# Patient Record
Sex: MALE | Race: BLACK OR AFRICAN AMERICAN | HISPANIC OR LATINO | Employment: FULL TIME | URBAN - METROPOLITAN AREA
[De-identification: names, ages, dates, MRNs, and addresses within clinical notes are randomized per-mention and may not be internally consistent; named-entity substitution may affect disease eponyms.]

---

## 2020-03-13 ENCOUNTER — HOSPITAL ENCOUNTER (EMERGENCY)
Facility: HOSPITAL | Age: 20
Discharge: HOME | End: 2020-03-13
Attending: EMERGENCY MEDICINE
Payer: COMMERCIAL

## 2020-03-13 VITALS
HEART RATE: 69 BPM | OXYGEN SATURATION: 97 % | TEMPERATURE: 98 F | SYSTOLIC BLOOD PRESSURE: 181 MMHG | RESPIRATION RATE: 18 BRPM | WEIGHT: 250 LBS | DIASTOLIC BLOOD PRESSURE: 98 MMHG

## 2020-03-13 DIAGNOSIS — R82.81 PYURIA: ICD-10-CM

## 2020-03-13 DIAGNOSIS — M54.50 LOW BACK PAIN: Primary | ICD-10-CM

## 2020-03-13 LAB
BACTERIA UR QL AUTO: ABNORMAL /HPF
BILIRUB UR QL STRIP: NEGATIVE
CLARITY UR: CLEAR
COLOR UR: YELLOW
GLUCOSE UR STRIP-MCNC: NEGATIVE MG/DL
HGB UR QL STRIP.AUTO: NEGATIVE
KETONES UR STRIP-MCNC: NEGATIVE MG/DL
LEUKOCYTE ESTERASE UR QL STRIP: ABNORMAL
NITRITE UR QL STRIP: NEGATIVE
NON-SQ EPI CELLS URNS QL MICRO: ABNORMAL /HPF
PH UR STRIP.AUTO: 6.5 [PH] (ref 4.5–8)
PROT UR STRIP-MCNC: ABNORMAL MG/DL
RBC #/AREA URNS AUTO: ABNORMAL /HPF
SP GR UR STRIP.AUTO: 1.02 (ref 1–1.03)
UROBILINOGEN UR QL STRIP.AUTO: 0.2 E.U./DL
WBC #/AREA URNS AUTO: ABNORMAL /HPF

## 2020-03-13 PROCEDURE — 99284 EMERGENCY DEPT VISIT MOD MDM: CPT | Performed by: PHYSICIAN ASSISTANT

## 2020-03-13 PROCEDURE — 99283 EMERGENCY DEPT VISIT LOW MDM: CPT

## 2020-03-13 PROCEDURE — 81001 URINALYSIS AUTO W/SCOPE: CPT

## 2020-03-13 PROCEDURE — 87086 URINE CULTURE/COLONY COUNT: CPT

## 2020-03-13 PROCEDURE — 96372 THER/PROPH/DIAG INJ SC/IM: CPT

## 2020-03-13 RX ORDER — CEPHALEXIN 500 MG/1
500 CAPSULE ORAL EVERY 12 HOURS SCHEDULED
Qty: 14 CAPSULE | Refills: 0 | Status: SHIPPED | OUTPATIENT
Start: 2020-03-13 | End: 2020-03-20

## 2020-03-13 RX ORDER — AMLODIPINE BESYLATE 5 MG/1
5 TABLET ORAL DAILY
COMMUNITY
Start: 2020-01-03

## 2020-03-13 RX ORDER — NAPROXEN 500 MG/1
500 TABLET ORAL 2 TIMES DAILY PRN
Qty: 30 TABLET | Refills: 0 | Status: SHIPPED | OUTPATIENT
Start: 2020-03-13

## 2020-03-13 RX ORDER — KETOROLAC TROMETHAMINE 30 MG/ML
15 INJECTION, SOLUTION INTRAMUSCULAR; INTRAVENOUS ONCE
Status: COMPLETED | OUTPATIENT
Start: 2020-03-13 | End: 2020-03-13

## 2020-03-13 RX ADMIN — KETOROLAC TROMETHAMINE 15 MG: 30 INJECTION, SOLUTION INTRAMUSCULAR at 14:09

## 2020-03-13 NOTE — ED PROVIDER NOTES
History  Chief Complaint   Patient presents with    Back Pain     Right sided back pain x 2 days, worse with movement denies urinary sx  Hubert Land is a 24 yo male presenting with 2 days of right low back pain which she states began gradually after work  Patient states he frequently bends over lift heavy boxes while at work  Denies specific injury, trauma, or fall  No rashes or lesions  Denies abdominal pain, nausea, vomiting and diarrhea, fevers, or chills  Denies dysuria, urinary urgency, urinary frequency, or gross hematuria  No prior history of similar  History provided by:  Patient   used: No    Back Pain   Location:  Lumbar spine  Onset quality:  Gradual  Duration:  2 days  Timing:  Constant  Progression:  Waxing and waning  Chronicity:  New  Context: lifting heavy objects    Context: not falling and not recent injury    Relieved by:  Being still  Worsened by:  Touching, twisting and bending  Ineffective treatments:  None tried  Associated symptoms: no abdominal pain, no bladder incontinence, no bowel incontinence, no chest pain, no dysuria, no fever, no headaches, no leg pain, no numbness, no paresthesias, no pelvic pain, no perianal numbness and no weakness    Risk factors: no recent surgery and no steroid use        Prior to Admission Medications   Prescriptions Last Dose Informant Patient Reported? Taking? amLODIPine (NORVASC) 5 mg tablet   Yes Yes   Sig: Take 5 mg by mouth daily      Facility-Administered Medications: None       Past Medical History:   Diagnosis Date    Asthma     Hypertension        History reviewed  No pertinent surgical history  History reviewed  No pertinent family history  I have reviewed and agree with the history as documented      E-Cigarette/Vaping    E-Cigarette Use Never User      E-Cigarette/Vaping Substances     Social History     Tobacco Use    Smoking status: Never Smoker    Smokeless tobacco: Never Used   Substance Use Topics    Alcohol use: Never     Frequency: Never    Drug use: Yes     Types: Marijuana       Review of Systems   Constitutional: Negative for activity change, chills and fever  HENT: Negative for congestion, ear discharge, ear pain, mouth sores, rhinorrhea, sinus pressure, sinus pain, sore throat and voice change  Eyes: Negative for photophobia, pain, redness and visual disturbance  Respiratory: Negative for cough, chest tightness, shortness of breath and wheezing  Cardiovascular: Negative for chest pain and palpitations  Gastrointestinal: Negative for abdominal pain, bowel incontinence, constipation, diarrhea, nausea and vomiting  Genitourinary: Negative for bladder incontinence, difficulty urinating, dysuria, enuresis, flank pain, frequency, hematuria, pelvic pain, scrotal swelling, testicular pain and urgency  Musculoskeletal: Positive for back pain  Negative for gait problem, myalgias, neck pain and neck stiffness  Skin: Negative for pallor, rash and wound  Neurological: Negative for dizziness, tremors, speech difficulty, weakness, light-headedness, numbness, headaches and paresthesias  No loss of bowel/bladder control       Physical Exam  Physical Exam   Constitutional: He is oriented to person, place, and time  He appears well-developed and well-nourished  No distress  HENT:   Head: Normocephalic and atraumatic  Right Ear: External ear normal    Left Ear: External ear normal    Nose: Nose normal    Mouth/Throat: Oropharynx is clear and moist  No oropharyngeal exudate  Eyes: Pupils are equal, round, and reactive to light  Conjunctivae and EOM are normal    Neck: Normal range of motion  Neck supple  Cardiovascular: Normal rate, regular rhythm, normal heart sounds and intact distal pulses  Exam reveals no gallop and no friction rub  No murmur heard  Pulmonary/Chest: Effort normal and breath sounds normal  No respiratory distress  He has no wheezes  He has no rales  Abdominal: Soft  He exhibits no distension and no mass  There is no tenderness  There is no rigidity, no rebound, no guarding, no CVA tenderness, no tenderness at McBurney's point and negative Spence's sign  Musculoskeletal: Normal range of motion  He exhibits no edema, tenderness or deformity  Arms:  Lymphadenopathy:     He has no cervical adenopathy  Neurological: He is alert and oriented to person, place, and time  He has normal strength  He displays normal reflexes  No cranial nerve deficit or sensory deficit  He exhibits normal muscle tone  Coordination and gait normal    Skin: Skin is warm and dry  Capillary refill takes less than 2 seconds  No rash noted  He is not diaphoretic  No erythema  No pallor  Psychiatric: He has a normal mood and affect  His behavior is normal  Judgment and thought content normal    Nursing note and vitals reviewed        Vital Signs  ED Triage Vitals   Temperature Pulse Respirations Blood Pressure SpO2   03/13/20 1229 03/13/20 1229 03/13/20 1229 03/13/20 1229 03/13/20 1229   98 °F (36 7 °C) 69 18 (!) 186/98 97 %      Temp Source Heart Rate Source Patient Position - Orthostatic VS BP Location FiO2 (%)   03/13/20 1229 -- 03/13/20 1412 03/13/20 1412 --   Temporal  Sitting Right arm       Pain Score       03/13/20 1229       Worst Possible Pain           Vitals:    03/13/20 1229 03/13/20 1412   BP: (!) 186/98 (!) 181/98   Pulse: 69    Patient Position - Orthostatic VS:  Sitting         Visual Acuity      ED Medications  Medications   ketorolac (TORADOL) injection 15 mg (15 mg Intramuscular Given 3/13/20 1409)       Diagnostic Studies  Results Reviewed     Procedure Component Value Units Date/Time    Urine Microscopic [[id number]]  (Abnormal) Collected:  03/13/20 1254    Lab Status:  Final result Specimen:  Urine, Clean Catch Updated:  03/13/20 1325     RBC, UA 0-1 /hpf      WBC, UA 10-20 /hpf      Epithelial Cells Occasional /hpf      Bacteria, UA Occasional /hpf     Urine culture [[id number]] Collected:  03/13/20 1254    Lab Status: In process Specimen:  Urine, Clean Catch Updated:  03/13/20 1325    POCT urinalysis dipstick [[id number]]  (Abnormal) Resulted:  03/13/20 1255    Lab Status:  Final result Updated:  03/13/20 1301    Urine Macroscopic, POC [[id number]]  (Abnormal) Collected:  03/13/20 1254    Lab Status:  Final result Specimen:  Urine Updated:  03/13/20 1255     Color, UA Yellow     Clarity, UA Clear     pH, UA 6 5     Leukocytes, UA Small     Nitrite, UA Negative     Protein,  (2+) mg/dl      Glucose, UA Negative mg/dl      Ketones, UA Negative mg/dl      Urobilinogen, UA 0 2 E U /dl      Bilirubin, UA Negative     Blood, UA Negative     Specific Gravity, UA 1 025    Narrative:       CLINITEK RESULT                 No orders to display              Procedures  Procedures         ED Course                                 MDM  Number of Diagnoses or Management Options  Low back pain:   Pyuria:   Diagnosis management comments: R low back pain for 2 days after lifting heavy objects at work  No LE neuro symptoms or deficits, no alarm symptoms  No urinary symptoms at this time but 10-20 WBC's on urine dip  Discussed risks/benefits of covering for UTI vs awaiting culture  Will prescribe keflex x7 days  Naproxen PRN  F/u with primary care physician is advised  Return indications discussed          Amount and/or Complexity of Data Reviewed  Clinical lab tests: ordered and reviewed    Patient Progress  Patient progress: stable        Disposition  Final diagnoses:   Low back pain   Pyuria     Time reflects when diagnosis was documented in both MDM as applicable and the Disposition within this note     Time User Action Codes Description Comment    3/13/2020  2:12 PM Emeka Blush Add [M54 5] Acute right-sided low back pain without sciatica     3/13/2020  2:14 PM Emeka Blush Remove [M54 5] Acute right-sided low back pain without sciatica     3/13/2020  2:14 PM Milagros Malcolm [M54 5] Low back pain     3/13/2020  2:15 PM Nayeli Awkward Add [R82 81] Pyuria       ED Disposition     ED Disposition Condition Date/Time Comment    Discharge Stable Fri Mar 13, 2020  2:12 PM Jaci Shown discharge to home/self care  Follow-up Information     Follow up With Specialties Details Why [address] [facility] Emergency Department Emergency Medicine  If symptoms worsen [facility] [id number]  [place] [address] ED, [address] [facility] [place], [place], [address]          Discharge Medication List as of 3/13/2020  2:17 PM      START taking these medications    Details   cephalexin (KEFLEX) 500 mg capsule Take 1 capsule (500 mg total) by mouth every 12 (twelve) hours for 7 days, Starting Fri 3/13/2020, Until Fri 3/20/2020, Normal      naproxen (NAPROSYN) 500 mg tablet Take 1 tablet (500 mg total) by mouth 2 (two) times a day as needed for mild pain or moderate pain, Starting Fri 3/13/2020, Normal         CONTINUE these medications which have NOT CHANGED    Details   amLODIPine (NORVASC) 5 mg tablet Take 5 mg by mouth daily, Starting Fri 1/3/2020, Historical Med           No discharge procedures on file      PDMP Review     None          ED Provider  Electronically Signed by           Linda Fernandez PA-C  03/13/20 7738

## 2020-03-14 LAB — BACTERIA UR CULT: NORMAL

## 2020-03-30 ENCOUNTER — HOSPITAL ENCOUNTER (EMERGENCY)
Facility: HOSPITAL | Age: 20
Discharge: HOME | End: 2020-03-30
Attending: EMERGENCY MEDICINE | Admitting: EMERGENCY MEDICINE
Payer: COMMERCIAL

## 2020-03-30 ENCOUNTER — APPOINTMENT (EMERGENCY)
Dept: CT IMAGING | Facility: HOSPITAL | Age: 20
End: 2020-03-30
Payer: COMMERCIAL

## 2020-03-30 ENCOUNTER — APPOINTMENT (EMERGENCY)
Dept: ULTRASOUND IMAGING | Facility: HOSPITAL | Age: 20
End: 2020-03-30
Payer: COMMERCIAL

## 2020-03-30 VITALS
SYSTOLIC BLOOD PRESSURE: 172 MMHG | DIASTOLIC BLOOD PRESSURE: 83 MMHG | HEART RATE: 79 BPM | TEMPERATURE: 97 F | RESPIRATION RATE: 18 BRPM | WEIGHT: 251.99 LBS | OXYGEN SATURATION: 98 %

## 2020-03-30 DIAGNOSIS — K82.8 THICKENING OF WALL OF GALLBLADDER: ICD-10-CM

## 2020-03-30 DIAGNOSIS — K80.20 GALLSTONE: ICD-10-CM

## 2020-03-30 DIAGNOSIS — R11.2 NAUSEA AND VOMITING: ICD-10-CM

## 2020-03-30 DIAGNOSIS — R10.11 RUQ PAIN: Primary | ICD-10-CM

## 2020-03-30 LAB
ALBUMIN SERPL BCP-MCNC: 3.9 G/DL (ref 3.5–5)
ALP SERPL-CCNC: 81 U/L (ref 46–484)
ALT SERPL W P-5'-P-CCNC: 43 U/L (ref 12–78)
ANION GAP SERPL CALCULATED.3IONS-SCNC: 6 MMOL/L (ref 4–13)
AST SERPL W P-5'-P-CCNC: 22 U/L (ref 5–45)
BACTERIA UR QL AUTO: ABNORMAL /HPF
BASOPHILS # BLD AUTO: 0.06 THOUSANDS/ΜL (ref 0–0.1)
BASOPHILS NFR BLD AUTO: 1 % (ref 0–1)
BILIRUB SERPL-MCNC: 0.2 MG/DL (ref 0.2–1)
BILIRUB UR QL STRIP: NEGATIVE
BUN SERPL-MCNC: 14 MG/DL (ref 5–25)
CALCIUM SERPL-MCNC: 9.2 MG/DL (ref 8.3–10.1)
CHLORIDE SERPL-SCNC: 104 MMOL/L (ref 100–108)
CLARITY UR: CLEAR
CLARITY, POC: CLEAR
CO2 SERPL-SCNC: 29 MMOL/L (ref 21–32)
COLOR UR: YELLOW
COLOR, POC: YELLOW
CREAT SERPL-MCNC: 0.79 MG/DL (ref 0.6–1.3)
EOSINOPHIL # BLD AUTO: 0.13 THOUSAND/ΜL (ref 0–0.61)
EOSINOPHIL NFR BLD AUTO: 1 % (ref 0–6)
ERYTHROCYTE [DISTWIDTH] IN BLOOD BY AUTOMATED COUNT: 12.9 % (ref 11.6–15.1)
GFR SERPL CREATININE-BSD FRML MDRD: 150 ML/MIN/1.73SQ M
GLUCOSE SERPL-MCNC: 133 MG/DL (ref 65–140)
GLUCOSE UR STRIP-MCNC: NEGATIVE MG/DL
HCT VFR BLD AUTO: 46.5 % (ref 36.5–49.3)
HGB BLD-MCNC: 15.2 G/DL (ref 12–17)
HGB UR QL STRIP.AUTO: NEGATIVE
IMM GRANULOCYTES # BLD AUTO: 0.03 THOUSAND/UL (ref 0–0.2)
IMM GRANULOCYTES NFR BLD AUTO: 0 % (ref 0–2)
KETONES UR STRIP-MCNC: NEGATIVE MG/DL
LEUKOCYTE ESTERASE UR QL STRIP: NEGATIVE
LIPASE SERPL-CCNC: 78 U/L (ref 73–393)
LYMPHOCYTES # BLD AUTO: 1.09 THOUSANDS/ΜL (ref 0.6–4.47)
LYMPHOCYTES NFR BLD AUTO: 10 % (ref 14–44)
MCH RBC QN AUTO: 28.7 PG (ref 26.8–34.3)
MCHC RBC AUTO-ENTMCNC: 32.7 G/DL (ref 31.4–37.4)
MCV RBC AUTO: 88 FL (ref 82–98)
MONOCYTES # BLD AUTO: 0.39 THOUSAND/ΜL (ref 0.17–1.22)
MONOCYTES NFR BLD AUTO: 4 % (ref 4–12)
NEUTROPHILS # BLD AUTO: 9.34 THOUSANDS/ΜL (ref 1.85–7.62)
NEUTS SEG NFR BLD AUTO: 84 % (ref 43–75)
NITRITE UR QL STRIP: NEGATIVE
NON-SQ EPI CELLS URNS QL MICRO: ABNORMAL /HPF
NRBC BLD AUTO-RTO: 0 /100 WBCS
PH UR STRIP.AUTO: 7 [PH] (ref 4.5–8)
PLATELET # BLD AUTO: 284 THOUSANDS/UL (ref 149–390)
PMV BLD AUTO: 10.7 FL (ref 8.9–12.7)
POTASSIUM SERPL-SCNC: 3.4 MMOL/L (ref 3.5–5.3)
PROT SERPL-MCNC: 7.9 G/DL (ref 6.4–8.2)
PROT UR STRIP-MCNC: ABNORMAL MG/DL
RBC # BLD AUTO: 5.29 MILLION/UL (ref 3.88–5.62)
RBC #/AREA URNS AUTO: ABNORMAL /HPF
SODIUM SERPL-SCNC: 139 MMOL/L (ref 136–145)
SP GR UR STRIP.AUTO: 1.02 (ref 1–1.03)
UROBILINOGEN UR QL STRIP.AUTO: 0.2 E.U./DL
WBC # BLD AUTO: 11.04 THOUSAND/UL (ref 4.31–10.16)
WBC #/AREA URNS AUTO: ABNORMAL /HPF

## 2020-03-30 PROCEDURE — NC001 PR NO CHARGE: Performed by: PHYSICIAN ASSISTANT

## 2020-03-30 PROCEDURE — 83690 ASSAY OF LIPASE: CPT | Performed by: PHYSICIAN ASSISTANT

## 2020-03-30 PROCEDURE — 96361 HYDRATE IV INFUSION ADD-ON: CPT

## 2020-03-30 PROCEDURE — 99284 EMERGENCY DEPT VISIT MOD MDM: CPT

## 2020-03-30 PROCEDURE — 96374 THER/PROPH/DIAG INJ IV PUSH: CPT

## 2020-03-30 PROCEDURE — 36415 COLL VENOUS BLD VENIPUNCTURE: CPT | Performed by: PHYSICIAN ASSISTANT

## 2020-03-30 PROCEDURE — 80053 COMPREHEN METABOLIC PANEL: CPT | Performed by: PHYSICIAN ASSISTANT

## 2020-03-30 PROCEDURE — 96376 TX/PRO/DX INJ SAME DRUG ADON: CPT

## 2020-03-30 PROCEDURE — 96375 TX/PRO/DX INJ NEW DRUG ADDON: CPT

## 2020-03-30 PROCEDURE — 99284 EMERGENCY DEPT VISIT MOD MDM: CPT | Performed by: PHYSICIAN ASSISTANT

## 2020-03-30 PROCEDURE — 81001 URINALYSIS AUTO W/SCOPE: CPT

## 2020-03-30 PROCEDURE — 85025 COMPLETE CBC W/AUTO DIFF WBC: CPT | Performed by: PHYSICIAN ASSISTANT

## 2020-03-30 PROCEDURE — 76705 ECHO EXAM OF ABDOMEN: CPT

## 2020-03-30 PROCEDURE — 74177 CT ABD & PELVIS W/CONTRAST: CPT

## 2020-03-30 RX ORDER — ONDANSETRON 2 MG/ML
4 INJECTION INTRAMUSCULAR; INTRAVENOUS ONCE
Status: COMPLETED | OUTPATIENT
Start: 2020-03-30 | End: 2020-03-30

## 2020-03-30 RX ORDER — ONDANSETRON 4 MG/1
4 TABLET, ORALLY DISINTEGRATING ORAL EVERY 6 HOURS PRN
Qty: 20 TABLET | Refills: 0 | Status: SHIPPED | OUTPATIENT
Start: 2020-03-30

## 2020-03-30 RX ORDER — OXYCODONE HYDROCHLORIDE AND ACETAMINOPHEN 5; 325 MG/1; MG/1
1 TABLET ORAL EVERY 6 HOURS PRN
Qty: 8 TABLET | Refills: 0 | Status: SHIPPED | OUTPATIENT
Start: 2020-03-30

## 2020-03-30 RX ADMIN — MORPHINE SULFATE 2 MG: 2 INJECTION, SOLUTION INTRAMUSCULAR; INTRAVENOUS at 17:15

## 2020-03-30 RX ADMIN — MORPHINE SULFATE 2 MG: 2 INJECTION, SOLUTION INTRAMUSCULAR; INTRAVENOUS at 11:55

## 2020-03-30 RX ADMIN — IOHEXOL 100 ML: 350 INJECTION, SOLUTION INTRAVENOUS at 13:33

## 2020-03-30 RX ADMIN — ONDANSETRON 4 MG: 2 INJECTION INTRAMUSCULAR; INTRAVENOUS at 11:55

## 2020-03-30 RX ADMIN — SODIUM CHLORIDE 1000 ML: 0.9 INJECTION, SOLUTION INTRAVENOUS at 11:54

## 2020-03-31 ENCOUNTER — TELEPHONE (OUTPATIENT)
Dept: SURGERY | Facility: CLINIC | Age: 20
End: 2020-03-31

## 2020-03-31 NOTE — TELEPHONE ENCOUNTER
Patient called to schedule appt for follow up from ED- gallbladder issues  Patient was informed that [facility] for [facility] does not participate with insurance  Patient gave me a [employer] that he has through his employer, this coverage was termed 1/1/20 and is no longer active  Patient was informed that his appt for 4/7 would be cancelled and he is to follow up with his employer regarding his [employer] or call [facility] to use the [facility] plan

## 2020-04-04 ENCOUNTER — HOSPITAL ENCOUNTER (EMERGENCY)
Facility: HOSPITAL | Age: 20
Discharge: HOME | End: 2020-04-04
Attending: EMERGENCY MEDICINE | Admitting: EMERGENCY MEDICINE
Payer: COMMERCIAL

## 2020-04-04 ENCOUNTER — APPOINTMENT (EMERGENCY)
Dept: ULTRASOUND IMAGING | Facility: HOSPITAL | Age: 20
End: 2020-04-04
Payer: COMMERCIAL

## 2020-04-04 VITALS
TEMPERATURE: 96.8 F | OXYGEN SATURATION: 98 % | DIASTOLIC BLOOD PRESSURE: 62 MMHG | SYSTOLIC BLOOD PRESSURE: 144 MMHG | HEART RATE: 71 BPM | WEIGHT: 249.12 LBS | RESPIRATION RATE: 18 BRPM

## 2020-04-04 DIAGNOSIS — K80.20 SYMPTOMATIC CHOLELITHIASIS: Primary | ICD-10-CM

## 2020-04-04 LAB
ALBUMIN SERPL BCP-MCNC: 3.8 G/DL (ref 3.5–5)
ALP SERPL-CCNC: 78 U/L (ref 46–484)
ALT SERPL W P-5'-P-CCNC: 52 U/L (ref 12–78)
ANION GAP SERPL CALCULATED.3IONS-SCNC: 8 MMOL/L (ref 4–13)
AST SERPL W P-5'-P-CCNC: 23 U/L (ref 5–45)
BASOPHILS # BLD AUTO: 0.07 THOUSANDS/ΜL (ref 0–0.1)
BASOPHILS NFR BLD AUTO: 1 % (ref 0–1)
BILIRUB SERPL-MCNC: 0.33 MG/DL (ref 0.2–1)
BUN SERPL-MCNC: 10 MG/DL (ref 5–25)
CALCIUM SERPL-MCNC: 8.8 MG/DL (ref 8.3–10.1)
CHLORIDE SERPL-SCNC: 102 MMOL/L (ref 100–108)
CO2 SERPL-SCNC: 28 MMOL/L (ref 21–32)
CREAT SERPL-MCNC: 0.73 MG/DL (ref 0.6–1.3)
EOSINOPHIL # BLD AUTO: 0.12 THOUSAND/ΜL (ref 0–0.61)
EOSINOPHIL NFR BLD AUTO: 1 % (ref 0–6)
ERYTHROCYTE [DISTWIDTH] IN BLOOD BY AUTOMATED COUNT: 12.9 % (ref 11.6–15.1)
GFR SERPL CREATININE-BSD FRML MDRD: 155 ML/MIN/1.73SQ M
GLUCOSE SERPL-MCNC: 130 MG/DL (ref 65–140)
HCT VFR BLD AUTO: 47.7 % (ref 36.5–49.3)
HGB BLD-MCNC: 15.5 G/DL (ref 12–17)
IMM GRANULOCYTES # BLD AUTO: 0.05 THOUSAND/UL (ref 0–0.2)
IMM GRANULOCYTES NFR BLD AUTO: 0 % (ref 0–2)
LIPASE SERPL-CCNC: 64 U/L (ref 73–393)
LYMPHOCYTES # BLD AUTO: 1.26 THOUSANDS/ΜL (ref 0.6–4.47)
LYMPHOCYTES NFR BLD AUTO: 9 % (ref 14–44)
MCH RBC QN AUTO: 28.8 PG (ref 26.8–34.3)
MCHC RBC AUTO-ENTMCNC: 32.5 G/DL (ref 31.4–37.4)
MCV RBC AUTO: 89 FL (ref 82–98)
MONOCYTES # BLD AUTO: 0.59 THOUSAND/ΜL (ref 0.17–1.22)
MONOCYTES NFR BLD AUTO: 4 % (ref 4–12)
NEUTROPHILS # BLD AUTO: 12.26 THOUSANDS/ΜL (ref 1.85–7.62)
NEUTS SEG NFR BLD AUTO: 85 % (ref 43–75)
NRBC BLD AUTO-RTO: 0 /100 WBCS
PLATELET # BLD AUTO: 331 THOUSANDS/UL (ref 149–390)
PMV BLD AUTO: 10.2 FL (ref 8.9–12.7)
POTASSIUM SERPL-SCNC: 3.3 MMOL/L (ref 3.5–5.3)
PROT SERPL-MCNC: 7.7 G/DL (ref 6.4–8.2)
RBC # BLD AUTO: 5.39 MILLION/UL (ref 3.88–5.62)
SODIUM SERPL-SCNC: 138 MMOL/L (ref 136–145)
WBC # BLD AUTO: 14.35 THOUSAND/UL (ref 4.31–10.16)

## 2020-04-04 PROCEDURE — 99284 EMERGENCY DEPT VISIT MOD MDM: CPT

## 2020-04-04 PROCEDURE — 83690 ASSAY OF LIPASE: CPT | Performed by: EMERGENCY MEDICINE

## 2020-04-04 PROCEDURE — 96374 THER/PROPH/DIAG INJ IV PUSH: CPT

## 2020-04-04 PROCEDURE — 96375 TX/PRO/DX INJ NEW DRUG ADDON: CPT

## 2020-04-04 PROCEDURE — 85025 COMPLETE CBC W/AUTO DIFF WBC: CPT | Performed by: EMERGENCY MEDICINE

## 2020-04-04 PROCEDURE — 99285 EMERGENCY DEPT VISIT HI MDM: CPT | Performed by: EMERGENCY MEDICINE

## 2020-04-04 PROCEDURE — 36415 COLL VENOUS BLD VENIPUNCTURE: CPT | Performed by: EMERGENCY MEDICINE

## 2020-04-04 PROCEDURE — 76705 ECHO EXAM OF ABDOMEN: CPT

## 2020-04-04 PROCEDURE — 80053 COMPREHEN METABOLIC PANEL: CPT | Performed by: EMERGENCY MEDICINE

## 2020-04-04 RX ORDER — MORPHINE SULFATE 4 MG/ML
4 INJECTION, SOLUTION INTRAMUSCULAR; INTRAVENOUS ONCE
Status: COMPLETED | OUTPATIENT
Start: 2020-04-04 | End: 2020-04-04

## 2020-04-04 RX ORDER — KETOROLAC TROMETHAMINE 30 MG/ML
15 INJECTION, SOLUTION INTRAMUSCULAR; INTRAVENOUS ONCE
Status: COMPLETED | OUTPATIENT
Start: 2020-04-04 | End: 2020-04-04

## 2020-04-04 RX ORDER — ONDANSETRON 2 MG/ML
4 INJECTION INTRAMUSCULAR; INTRAVENOUS ONCE
Status: COMPLETED | OUTPATIENT
Start: 2020-04-04 | End: 2020-04-04

## 2020-04-04 RX ADMIN — MORPHINE SULFATE 2 MG: 2 INJECTION, SOLUTION INTRAMUSCULAR; INTRAVENOUS at 14:29

## 2020-04-04 RX ADMIN — ONDANSETRON 4 MG: 2 INJECTION INTRAMUSCULAR; INTRAVENOUS at 12:32

## 2020-04-04 RX ADMIN — KETOROLAC TROMETHAMINE 15 MG: 30 INJECTION, SOLUTION INTRAMUSCULAR at 14:06

## 2020-04-04 RX ADMIN — MORPHINE SULFATE 4 MG: 4 INJECTION INTRAVENOUS at 12:32

## 2020-10-31 ENCOUNTER — HOSPITAL ENCOUNTER (EMERGENCY)
Facility: HOSPITAL | Age: 20
Discharge: HOME | End: 2020-10-31
Attending: EMERGENCY MEDICINE
Payer: OTHER MISCELLANEOUS

## 2020-10-31 VITALS
OXYGEN SATURATION: 98 % | SYSTOLIC BLOOD PRESSURE: 217 MMHG | DIASTOLIC BLOOD PRESSURE: 114 MMHG | BODY MASS INDEX: 39.12 KG/M2 | WEIGHT: 264.11 LBS | TEMPERATURE: 98.1 F | HEART RATE: 82 BPM | HEIGHT: 69 IN | RESPIRATION RATE: 18 BRPM

## 2020-10-31 DIAGNOSIS — Z91.14 NONCOMPLIANCE WITH MEDICATIONS: ICD-10-CM

## 2020-10-31 DIAGNOSIS — M54.16 ACUTE RIGHT LUMBAR RADICULOPATHY: Primary | ICD-10-CM

## 2020-10-31 DIAGNOSIS — I10 UNCONTROLLED HYPERTENSION: ICD-10-CM

## 2020-10-31 DIAGNOSIS — M54.50 ACUTE LOW BACK PAIN: ICD-10-CM

## 2020-10-31 PROCEDURE — 99283 EMERGENCY DEPT VISIT LOW MDM: CPT

## 2020-10-31 PROCEDURE — 96372 THER/PROPH/DIAG INJ SC/IM: CPT

## 2020-10-31 PROCEDURE — 99284 EMERGENCY DEPT VISIT MOD MDM: CPT | Performed by: EMERGENCY MEDICINE

## 2020-10-31 RX ORDER — METHOCARBAMOL 750 MG/1
750 TABLET, FILM COATED ORAL 3 TIMES DAILY PRN
Qty: 30 TABLET | Refills: 0 | Status: SHIPPED | OUTPATIENT
Start: 2020-10-31

## 2020-10-31 RX ORDER — HYDROCODONE BITARTRATE AND ACETAMINOPHEN 5; 325 MG/1; MG/1
1 TABLET ORAL EVERY 4 HOURS PRN
Qty: 12 TABLET | Refills: 0 | Status: SHIPPED | OUTPATIENT
Start: 2020-10-31 | End: 2020-10-31 | Stop reason: SDUPTHER

## 2020-10-31 RX ORDER — HYDROCODONE BITARTRATE AND ACETAMINOPHEN 5; 325 MG/1; MG/1
1 TABLET ORAL EVERY 4 HOURS PRN
Qty: 12 TABLET | Refills: 0 | Status: SHIPPED | OUTPATIENT
Start: 2020-10-31 | End: 2020-11-10

## 2020-10-31 RX ORDER — METHOCARBAMOL 750 MG/1
750 TABLET, FILM COATED ORAL ONCE
Status: COMPLETED | OUTPATIENT
Start: 2020-10-31 | End: 2020-10-31

## 2020-10-31 RX ORDER — PREDNISONE 10 MG/1
TABLET ORAL
Qty: 31 TABLET | Refills: 0 | Status: SHIPPED | OUTPATIENT
Start: 2020-10-31

## 2020-10-31 RX ORDER — KETOROLAC TROMETHAMINE 30 MG/ML
30 INJECTION, SOLUTION INTRAMUSCULAR; INTRAVENOUS ONCE
Status: COMPLETED | OUTPATIENT
Start: 2020-10-31 | End: 2020-10-31

## 2020-10-31 RX ADMIN — KETOROLAC TROMETHAMINE 30 MG: 30 INJECTION, SOLUTION INTRAMUSCULAR at 09:41

## 2020-10-31 RX ADMIN — METHOCARBAMOL 750 MG: 750 TABLET, FILM COATED ORAL at 09:41

## 2024-01-14 ENCOUNTER — HOSPITAL ENCOUNTER (EMERGENCY)
Facility: HOSPITAL | Age: 24
Discharge: HOME | End: 2024-01-14
Attending: EMERGENCY MEDICINE
Payer: COMMERCIAL

## 2024-01-14 ENCOUNTER — APPOINTMENT (EMERGENCY)
Dept: RADIOLOGY | Facility: HOSPITAL | Age: 24
End: 2024-01-14
Payer: COMMERCIAL

## 2024-01-14 VITALS
OXYGEN SATURATION: 98 % | WEIGHT: 264.77 LBS | DIASTOLIC BLOOD PRESSURE: 77 MMHG | BODY MASS INDEX: 39.1 KG/M2 | HEART RATE: 72 BPM | RESPIRATION RATE: 16 BRPM | SYSTOLIC BLOOD PRESSURE: 150 MMHG | TEMPERATURE: 98.3 F
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
## 2024-01-14 DIAGNOSIS — R07.81 RIB PAIN: ICD-10-CM

## 2024-01-14 DIAGNOSIS — S00.03XA HEMATOMA OF SCALP, INITIAL ENCOUNTER: ICD-10-CM

## 2024-01-14 DIAGNOSIS — H57.8A2 SENSATION OF FOREIGN BODY IN LEFT EYE: ICD-10-CM

## 2024-01-14 DIAGNOSIS — S60.519A HAND ABRASION: ICD-10-CM

## 2024-01-14 PROCEDURE — 96372 THER/PROPH/DIAG INJ SC/IM: CPT

## 2024-01-14 PROCEDURE — 73502 X-RAY EXAM HIP UNI 2-3 VIEWS: CPT

## 2024-01-14 PROCEDURE — 73130 X-RAY EXAM OF HAND: CPT

## 2024-01-14 PROCEDURE — 73060 X-RAY EXAM OF HUMERUS: CPT

## 2024-01-14 PROCEDURE — 71101 X-RAY EXAM UNILAT RIBS/CHEST: CPT

## 2024-01-14 PROCEDURE — 99283 EMERGENCY DEPT VISIT LOW MDM: CPT

## 2024-01-14 PROCEDURE — 99284 EMERGENCY DEPT VISIT MOD MDM: CPT | Performed by: EMERGENCY MEDICINE

## 2024-01-14 RX ORDER — KETOROLAC TROMETHAMINE 30 MG/ML
15 INJECTION, SOLUTION INTRAMUSCULAR; INTRAVENOUS ONCE
Status: DISCONTINUED | OUTPATIENT
Start: 2024-01-14 | End: 2024-01-14

## 2024-01-14 RX ORDER — METHOCARBAMOL 500 MG/1
500 TABLET, FILM COATED ORAL ONCE
Status: COMPLETED | OUTPATIENT
Start: 2024-01-14 | End: 2024-01-14

## 2024-01-14 RX ORDER — NIFEDIPINE 30 MG
30 TABLET, EXTENDED RELEASE ORAL DAILY
COMMUNITY

## 2024-01-14 RX ORDER — TETRACAINE HYDROCHLORIDE 5 MG/ML
2 SOLUTION OPHTHALMIC ONCE
Status: COMPLETED | OUTPATIENT
Start: 2024-01-14 | End: 2024-01-14

## 2024-01-14 RX ORDER — KETOROLAC TROMETHAMINE 30 MG/ML
15 INJECTION, SOLUTION INTRAMUSCULAR; INTRAVENOUS ONCE
Status: COMPLETED | OUTPATIENT
Start: 2024-01-14 | End: 2024-01-14

## 2024-01-14 RX ORDER — LOSARTAN POTASSIUM 25 MG/1
25 TABLET ORAL DAILY
COMMUNITY

## 2024-01-14 RX ADMIN — TETRACAINE HYDROCHLORIDE 2 DROP: 5 SOLUTION OPHTHALMIC at 14:25

## 2024-01-14 RX ADMIN — METHOCARBAMOL 500 MG: 500 TABLET ORAL at 13:14

## 2024-01-14 RX ADMIN — KETOROLAC TROMETHAMINE 15 MG: 30 INJECTION, SOLUTION INTRAMUSCULAR; INTRAVENOUS at 13:32

## 2024-01-14 RX ADMIN — FLUORESCEIN SODIUM 1 STRIP: 1 STRIP OPHTHALMIC at 13:14
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
## 2024-01-14 NOTE — Clinical Note
Kobi Rodriguez was seen and treated in our emergency department on 1/14/2024.                Diagnosis:     Kobi  may return to work on return date.    He may return on this date: 01/16/2024         If you have any questions or concerns, please don't hesitate to call.      Blair Allred DO    ______________________________           _______________          _______________  Hospital Representative                              Date                                Time
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
## 2024-07-17 ENCOUNTER — HOSPITAL ENCOUNTER (EMERGENCY)
Facility: HOSPITAL | Age: 24
End: 2024-07-17
Attending: EMERGENCY MEDICINE

## 2024-07-17 ENCOUNTER — HOSPITAL ENCOUNTER (INPATIENT)
Facility: HOSPITAL | Age: 24
LOS: 6 days | Discharge: HOME | DRG: 751 | End: 2024-07-23
Attending: PSYCHIATRY & NEUROLOGY | Admitting: PSYCHIATRY & NEUROLOGY
Payer: COMMERCIAL

## 2024-07-17 VITALS
OXYGEN SATURATION: 97 % | SYSTOLIC BLOOD PRESSURE: 134 MMHG | HEART RATE: 86 BPM | RESPIRATION RATE: 18 BRPM | DIASTOLIC BLOOD PRESSURE: 70 MMHG | TEMPERATURE: 98.8 F

## 2024-07-17 DIAGNOSIS — E55.9 VITAMIN D DEFICIENCY: ICD-10-CM

## 2024-07-17 DIAGNOSIS — I10 HTN (HYPERTENSION): ICD-10-CM

## 2024-07-17 DIAGNOSIS — E53.8 B12 DEFICIENCY: ICD-10-CM

## 2024-07-17 DIAGNOSIS — Z00.8 MEDICAL CLEARANCE FOR PSYCHIATRIC ADMISSION: ICD-10-CM

## 2024-07-17 DIAGNOSIS — F33.2 MAJOR DEPRESSIVE DISORDER, RECURRENT, SEVERE WITHOUT PSYCHOTIC FEATURES (HCC): Primary | Chronic | ICD-10-CM

## 2024-07-17 DIAGNOSIS — F32.A DEPRESSION WITH SUICIDAL IDEATION: Primary | ICD-10-CM

## 2024-07-17 DIAGNOSIS — E53.8 FOLIC ACID DEFICIENCY: ICD-10-CM

## 2024-07-17 DIAGNOSIS — R45.851 DEPRESSION WITH SUICIDAL IDEATION: Primary | ICD-10-CM

## 2024-07-17 LAB
AMPHETAMINES SERPL QL SCN: NEGATIVE
ATRIAL RATE: 86 BPM
BARBITURATES UR QL: NEGATIVE
BENZODIAZ UR QL: NEGATIVE
COCAINE UR QL: NEGATIVE
ETHANOL EXG-MCNC: 0 MG/DL
FENTANYL UR QL SCN: NEGATIVE
HYDROCODONE UR QL SCN: NEGATIVE
METHADONE UR QL: NEGATIVE
OPIATES UR QL SCN: NEGATIVE
OXYCODONE+OXYMORPHONE UR QL SCN: NEGATIVE
P AXIS: 46 DEGREES
PCP UR QL: NEGATIVE
PR INTERVAL: 158 MS
QRS AXIS: 43 DEGREES
QRSD INTERVAL: 106 MS
QT INTERVAL: 394 MS
QTC INTERVAL: 471 MS
T WAVE AXIS: 35 DEGREES
THC UR QL: NEGATIVE
VENTRICULAR RATE: 86 BPM

## 2024-07-17 PROCEDURE — NC001 PR NO CHARGE: Performed by: EMERGENCY MEDICINE

## 2024-07-17 PROCEDURE — 80307 DRUG TEST PRSMV CHEM ANLYZR: CPT | Performed by: EMERGENCY MEDICINE

## 2024-07-17 PROCEDURE — 93005 ELECTROCARDIOGRAM TRACING: CPT

## 2024-07-17 PROCEDURE — 99285 EMERGENCY DEPT VISIT HI MDM: CPT

## 2024-07-17 PROCEDURE — 99285 EMERGENCY DEPT VISIT HI MDM: CPT | Performed by: EMERGENCY MEDICINE

## 2024-07-17 PROCEDURE — 93010 ELECTROCARDIOGRAM REPORT: CPT

## 2024-07-17 PROCEDURE — 82075 ASSAY OF BREATH ETHANOL: CPT | Performed by: EMERGENCY MEDICINE

## 2024-07-17 RX ORDER — OLANZAPINE 10 MG/2ML
5 INJECTION, POWDER, FOR SOLUTION INTRAMUSCULAR
Status: DISCONTINUED | OUTPATIENT
Start: 2024-07-17 | End: 2024-07-23 | Stop reason: HOSPADM

## 2024-07-17 RX ORDER — OLANZAPINE 5 MG/1
5 TABLET ORAL
Status: CANCELLED | OUTPATIENT
Start: 2024-07-17

## 2024-07-17 RX ORDER — LOSARTAN POTASSIUM 25 MG/1
25 TABLET ORAL DAILY
Status: CANCELLED | OUTPATIENT
Start: 2024-07-18

## 2024-07-17 RX ORDER — ACETAMINOPHEN 325 MG/1
650 TABLET ORAL EVERY 4 HOURS PRN
Status: CANCELLED | OUTPATIENT
Start: 2024-07-17

## 2024-07-17 RX ORDER — LORAZEPAM 1 MG/1
1 TABLET ORAL
Status: DISCONTINUED | OUTPATIENT
Start: 2024-07-17 | End: 2024-07-23 | Stop reason: HOSPADM

## 2024-07-17 RX ORDER — AMOXICILLIN 250 MG
1 CAPSULE ORAL DAILY PRN
Status: DISCONTINUED | OUTPATIENT
Start: 2024-07-17 | End: 2024-07-23 | Stop reason: HOSPADM

## 2024-07-17 RX ORDER — LANOLIN ALCOHOL/MO/W.PET/CERES
3 CREAM (GRAM) TOPICAL
Status: CANCELLED | OUTPATIENT
Start: 2024-07-17

## 2024-07-17 RX ORDER — OLANZAPINE 5 MG/1
2.5 TABLET ORAL
Status: CANCELLED | OUTPATIENT
Start: 2024-07-17

## 2024-07-17 RX ORDER — TRAZODONE HYDROCHLORIDE 50 MG/1
50 TABLET ORAL
Status: DISCONTINUED | OUTPATIENT
Start: 2024-07-17 | End: 2024-07-23 | Stop reason: HOSPADM

## 2024-07-17 RX ORDER — BENZTROPINE MESYLATE 1 MG/1
1 TABLET ORAL
Status: DISCONTINUED | OUTPATIENT
Start: 2024-07-17 | End: 2024-07-23 | Stop reason: HOSPADM

## 2024-07-17 RX ORDER — POLYETHYLENE GLYCOL 3350 17 G/17G
17 POWDER, FOR SOLUTION ORAL DAILY PRN
Status: DISCONTINUED | OUTPATIENT
Start: 2024-07-17 | End: 2024-07-23 | Stop reason: HOSPADM

## 2024-07-17 RX ORDER — HYDROXYZINE HYDROCHLORIDE 25 MG/1
25 TABLET, FILM COATED ORAL
Status: CANCELLED | OUTPATIENT
Start: 2024-07-17

## 2024-07-17 RX ORDER — POLYETHYLENE GLYCOL 3350 17 G/17G
17 POWDER, FOR SOLUTION ORAL DAILY PRN
Status: CANCELLED | OUTPATIENT
Start: 2024-07-17

## 2024-07-17 RX ORDER — LANOLIN ALCOHOL/MO/W.PET/CERES
3 CREAM (GRAM) TOPICAL
Status: DISCONTINUED | OUTPATIENT
Start: 2024-07-17 | End: 2024-07-18

## 2024-07-17 RX ORDER — LORAZEPAM 2 MG/ML
2 INJECTION INTRAMUSCULAR
Status: CANCELLED | OUTPATIENT
Start: 2024-07-17

## 2024-07-17 RX ORDER — BENZTROPINE MESYLATE 1 MG/ML
1 INJECTION INTRAMUSCULAR; INTRAVENOUS
Status: CANCELLED | OUTPATIENT
Start: 2024-07-17

## 2024-07-17 RX ORDER — LOSARTAN POTASSIUM 50 MG/1
50 TABLET ORAL DAILY
Status: DISCONTINUED | OUTPATIENT
Start: 2024-07-17 | End: 2024-07-19

## 2024-07-17 RX ORDER — LORAZEPAM 1 MG/1
1 TABLET ORAL
Status: CANCELLED | OUTPATIENT
Start: 2024-07-17

## 2024-07-17 RX ORDER — BENZTROPINE MESYLATE 1 MG/ML
1 INJECTION INTRAMUSCULAR; INTRAVENOUS
Status: DISCONTINUED | OUTPATIENT
Start: 2024-07-17 | End: 2024-07-23 | Stop reason: HOSPADM

## 2024-07-17 RX ORDER — LOSARTAN POTASSIUM 25 MG/1
25 TABLET ORAL DAILY
Status: DISCONTINUED | OUTPATIENT
Start: 2024-07-17 | End: 2024-07-17 | Stop reason: HOSPADM

## 2024-07-17 RX ORDER — AMOXICILLIN 250 MG
1 CAPSULE ORAL DAILY PRN
Status: CANCELLED | OUTPATIENT
Start: 2024-07-17

## 2024-07-17 RX ORDER — ACETAMINOPHEN 325 MG/1
650 TABLET ORAL EVERY 4 HOURS PRN
Status: DISCONTINUED | OUTPATIENT
Start: 2024-07-17 | End: 2024-07-23 | Stop reason: HOSPADM

## 2024-07-17 RX ORDER — ACETAMINOPHEN 325 MG/1
650 TABLET ORAL EVERY 6 HOURS PRN
Status: CANCELLED | OUTPATIENT
Start: 2024-07-17

## 2024-07-17 RX ORDER — OLANZAPINE 10 MG/2ML
2.5 INJECTION, POWDER, FOR SOLUTION INTRAMUSCULAR
Status: CANCELLED | OUTPATIENT
Start: 2024-07-17

## 2024-07-17 RX ORDER — ACETAMINOPHEN 325 MG/1
975 TABLET ORAL EVERY 6 HOURS PRN
Status: CANCELLED | OUTPATIENT
Start: 2024-07-17

## 2024-07-17 RX ORDER — OLANZAPINE 10 MG/2ML
5 INJECTION, POWDER, FOR SOLUTION INTRAMUSCULAR
Status: CANCELLED | OUTPATIENT
Start: 2024-07-17

## 2024-07-17 RX ORDER — PROPRANOLOL HYDROCHLORIDE 20 MG/1
10 TABLET ORAL EVERY 8 HOURS PRN
Status: CANCELLED | OUTPATIENT
Start: 2024-07-17

## 2024-07-17 RX ORDER — LORAZEPAM 2 MG/ML
1 INJECTION INTRAMUSCULAR EVERY 4 HOURS PRN
Status: DISCONTINUED | OUTPATIENT
Start: 2024-07-17 | End: 2024-07-23 | Stop reason: HOSPADM

## 2024-07-17 RX ORDER — MAGNESIUM HYDROXIDE/ALUMINUM HYDROXICE/SIMETHICONE 120; 1200; 1200 MG/30ML; MG/30ML; MG/30ML
30 SUSPENSION ORAL EVERY 4 HOURS PRN
Status: DISCONTINUED | OUTPATIENT
Start: 2024-07-17 | End: 2024-07-23 | Stop reason: HOSPADM

## 2024-07-17 RX ORDER — BENZTROPINE MESYLATE 1 MG/1
1 TABLET ORAL
Status: CANCELLED | OUTPATIENT
Start: 2024-07-17

## 2024-07-17 RX ORDER — OLANZAPINE 10 MG/2ML
2.5 INJECTION, POWDER, FOR SOLUTION INTRAMUSCULAR
Status: DISCONTINUED | OUTPATIENT
Start: 2024-07-17 | End: 2024-07-23 | Stop reason: HOSPADM

## 2024-07-17 RX ORDER — HYDROXYZINE HYDROCHLORIDE 25 MG/1
25 TABLET, FILM COATED ORAL
Status: DISCONTINUED | OUTPATIENT
Start: 2024-07-17 | End: 2024-07-23 | Stop reason: HOSPADM

## 2024-07-17 RX ORDER — HYDROXYZINE HYDROCHLORIDE 25 MG/1
50 TABLET, FILM COATED ORAL
Status: CANCELLED | OUTPATIENT
Start: 2024-07-17

## 2024-07-17 RX ORDER — TRAZODONE HYDROCHLORIDE 50 MG/1
50 TABLET ORAL
Status: CANCELLED | OUTPATIENT
Start: 2024-07-17

## 2024-07-17 RX ORDER — OLANZAPINE 2.5 MG/1
2.5 TABLET, FILM COATED ORAL
Status: DISCONTINUED | OUTPATIENT
Start: 2024-07-17 | End: 2024-07-23 | Stop reason: HOSPADM

## 2024-07-17 RX ORDER — NIFEDIPINE 30 MG/1
30 TABLET, EXTENDED RELEASE ORAL DAILY
Status: DISCONTINUED | OUTPATIENT
Start: 2024-07-17 | End: 2024-07-17 | Stop reason: HOSPADM

## 2024-07-17 RX ORDER — NIFEDIPINE 30 MG/1
30 TABLET, EXTENDED RELEASE ORAL DAILY
Status: CANCELLED | OUTPATIENT
Start: 2024-07-18

## 2024-07-17 RX ORDER — HYDROXYZINE 50 MG/1
50 TABLET, FILM COATED ORAL
Status: DISCONTINUED | OUTPATIENT
Start: 2024-07-17 | End: 2024-07-23 | Stop reason: HOSPADM

## 2024-07-17 RX ORDER — OLANZAPINE 5 MG/1
5 TABLET ORAL
Status: DISCONTINUED | OUTPATIENT
Start: 2024-07-17 | End: 2024-07-23 | Stop reason: HOSPADM

## 2024-07-17 RX ORDER — LORAZEPAM 2 MG/ML
1 INJECTION INTRAMUSCULAR EVERY 4 HOURS PRN
Status: CANCELLED | OUTPATIENT
Start: 2024-07-17

## 2024-07-17 RX ORDER — LOSARTAN POTASSIUM 25 MG/1
25 TABLET ORAL DAILY
Status: DISCONTINUED | OUTPATIENT
Start: 2024-07-18 | End: 2024-07-17

## 2024-07-17 RX ORDER — PROPRANOLOL HYDROCHLORIDE 10 MG/1
10 TABLET ORAL EVERY 8 HOURS PRN
Status: DISCONTINUED | OUTPATIENT
Start: 2024-07-17 | End: 2024-07-23 | Stop reason: HOSPADM

## 2024-07-17 RX ORDER — AMLODIPINE BESYLATE 5 MG/1
5 TABLET ORAL DAILY
Status: DISCONTINUED | OUTPATIENT
Start: 2024-07-17 | End: 2024-07-17

## 2024-07-17 RX ORDER — NIFEDIPINE 30 MG/1
30 TABLET, EXTENDED RELEASE ORAL DAILY
Status: DISCONTINUED | OUTPATIENT
Start: 2024-07-18 | End: 2024-07-23 | Stop reason: HOSPADM

## 2024-07-17 RX ORDER — MAGNESIUM HYDROXIDE/ALUMINUM HYDROXICE/SIMETHICONE 120; 1200; 1200 MG/30ML; MG/30ML; MG/30ML
30 SUSPENSION ORAL EVERY 4 HOURS PRN
Status: CANCELLED | OUTPATIENT
Start: 2024-07-17

## 2024-07-17 RX ORDER — ACETAMINOPHEN 325 MG/1
975 TABLET ORAL EVERY 6 HOURS PRN
Status: DISCONTINUED | OUTPATIENT
Start: 2024-07-17 | End: 2024-07-23 | Stop reason: HOSPADM

## 2024-07-17 RX ORDER — ACETAMINOPHEN 325 MG/1
650 TABLET ORAL EVERY 6 HOURS PRN
Status: DISCONTINUED | OUTPATIENT
Start: 2024-07-17 | End: 2024-07-23 | Stop reason: HOSPADM

## 2024-07-17 RX ORDER — LORAZEPAM 2 MG/ML
2 INJECTION INTRAMUSCULAR
Status: DISCONTINUED | OUTPATIENT
Start: 2024-07-17 | End: 2024-07-23 | Stop reason: HOSPADM

## 2024-07-17 RX ADMIN — LOSARTAN POTASSIUM 50 MG: 50 TABLET, FILM COATED ORAL at 17:45

## 2024-07-17 RX ADMIN — NIFEDIPINE 30 MG: 30 TABLET, FILM COATED, EXTENDED RELEASE ORAL at 09:32

## 2024-07-17 RX ADMIN — LOSARTAN POTASSIUM 25 MG: 25 TABLET, FILM COATED ORAL at 09:32

## 2024-07-17 NOTE — NURSING NOTE
Pt's active home meds verified with pt. Pt is good historian for meds, states he is only taking procardia and cozaar.

## 2024-07-17 NOTE — ED NOTES
Roundtrip initiated at this time.    [facility] P/U 1600    Patient aware of acceptance and transport time to \[facility]\"".    Transport packet complete    Fer Boyce  Crisis Intervention Specialist II  07/17/24

## 2024-07-17 NOTE — ED NOTES
Patient is accepted at [facility].  Patient is accepted by Dr. Jessica Campbell in Intake.    Transportation is arranged with [facility].  Transportation is scheduled for 1600.  Patient may go to the floor at 1600.      Nurse report is to be called to [phone] prior to patient transfer.     Fer Boyce  Crisis Intervention Specialist II  07/17/24
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
## 2024-07-17 NOTE — NURSING NOTE
"Pt states med hx positive for sore wisdom teeth, decaying dentition, gallbladder removal. Prior finding from [facility] describe provider that found \"some extra fluid was around his heart\" per pt.     Pt states being on only Procardia 30mg and Cozaar 25mg daily.   "

## 2024-07-17 NOTE — ED NOTES
"Patient reporting he has been passively suicidal since his father  when he was young. He also reports his family life when young was harsh, negative and unsupportive. He had 3 outpatient counseling sessions after his father , then never went again because he didn't feel it was helping. Since then he has struggled without any professional services or supports. He reports when he was young he was prescribed meds to help with mood swings, but it didn't help and he stopped taking it pretty quickly. He has taken nothing since then. He and his girlfriend just had a baby girl. She is 5 months old, and the first child for both of them. They both report that they are stressed and overwhelmed. In addition, his girlfriend told [facility] that she has been diagnosed as having depression, anxiety disorder and panic attacks, and provided the example that \"yesterday I passed out from a panic attack and he (pointed at the pt) found me on the floor, so he has things like that to deal with too\". Patient doesn't discuss the other things that are stressors, but does state there are more. He denies HI, psychosis. Says he has difficulty sleeping, but his appetite is ok. Reports no substance abuse concerns, no legal issues. He is employed, but does not currently have medical insurance. He signed a voluntary admission form very reluctantly. He had asked to be discharged from the ED, but was told by the [facility] and the physician that he would be involuntarily committed if necessary, so he signed himself in, but stated he hopes he doesn't have to stay long. [facility] attempted a few times to explain how inpatient can help him begin to get help that can eventually provide relief from his longstanding depression, but he continued to maintain that he wants to leave the inpatient unit or the ED as soon as he is able.  "

## 2024-07-17 NOTE — ED NOTES
Patient's significant other at bedside. RN heard yelling from patient room. RN informed patient and visitor that yelling was not appropriate for ED and if the yelling continued or an escalation of behavior then the visitor was going to be asked to leave.      Nella Marinelli RN  07/17/24 3520

## 2024-07-17 NOTE — EMTALA/ACUTE CARE TRANSFER
[facility] EMERGENCY DEPARTMENT  [address] [address]  Dept: [phone]      EMTALA TRANSFER CONSENT    NAME Kobi SOLITARIO 2000                              MRN [id number]    I have been informed of my rights regarding examination, treatment, and transfer   by Dr. Ana María Baldwin DO    Benefits:      Risks: Potential for delay in receiving treatment, Increased discomfort during transfer      Consent for Transfer:  I acknowledge that my medical condition has been evaluated and explained to me by the emergency department physician or other qualified medical person and/or my attending physician, who has recommended that I be transferred to the service of  Accepting Physician: Dr. Lopez at Accepting Facility Name, City & State : [facility]; [facility]. The above potential benefits of such transfer, the potential risks associated with such transfer, and the probable risks of not being transferred have been explained to me, and I fully understand them.  The doctor has explained that, in my case, the benefits of transfer outweigh the risks.  I agree to be transferred.    I authorize the performance of emergency medical procedures and treatments upon me in both transit and upon arrival at the receiving facility.  Additionally, I authorize the release of any and all medical records to the receiving facility and request they be transported with me, if possible.  I understand that the safest mode of transportation during a medical emergency is an ambulance and that the Hospital advocates the use of this mode of transport. Risks of traveling to the receiving facility by car, including absence of medical control, life sustaining equipment, such as oxygen, and medical personnel has been explained to me and I fully understand them.    (SUNNY CORRECT BOX BELOW)  [  ]  I consent to the stated transfer and to be transported by  ambulance/helicopter.  [  ]  I consent to the stated transfer, but refuse transportation by ambulance and accept full responsibility for my transportation by car.  I understand the risks of non-ambulance transfers and I exonerate the Hospital and its staff from any deterioration in my condition that results from this refusal.    X___________________________________________    DATE  24  TIME________  Signature of patient or legally responsible individual signing on patient behalf           RELATIONSHIP TO PATIENT_________________________          Provider Certification    NAME Kobi Rodriguez DOB 2000                              MRN [id number]    A medical screening exam was performed on the above named patient.  Based on the examination:    Condition Necessitating Transfer The primary encounter diagnosis was Depression with suicidal ideation. A diagnosis of Medical clearance for psychiatric admission was also pertinent to this visit.    Patient Condition: The patient has been stabilized such that within reasonable medical probability, no material deterioration of the patient condition or the condition of the unborn child(terence) is likely to result from the transfer    Reason for Transfer: Level of Care needed not available at this facility    Transfer Requirements: Facility [address]   Space available and qualified personnel available for treatment as acknowledged by [facility] [phone]  Agreed to accept transfer and to provide appropriate medical treatment as acknowledged by       Dr. Lopez  Appropriate medical records of the examination and treatment of the patient are provided at the time of transfer   STAFF INITIAL WHEN COMPLETED _______  Transfer will be performed by qualified personnel from [facility]  and appropriate transfer equipment as required, including the use of necessary and appropriate life support measures.    Provider Certification: I have  examined the patient and explained the following risks and benefits of being transferred/refusing transfer to the patient/family:  General risk, such as traffic hazards, adverse weather conditions, rough terrain or turbulence, possible failure of equipment (including vehicle or aircraft), or consequences of actions of persons outside the control of the transport personnel      Based on these reasonable risks and benefits to the patient and/or the unborn child(terence), and based upon the information available at the time of the patient’s examination, I certify that the medical benefits reasonably to be expected from the provision of appropriate medical treatments at another medical facility outweigh the increasing risks, if any, to the individual’s medical condition, and in the case of labor to the unborn child, from effecting the transfer.    X____________________________________________ DATE 07/17/24        TIME_______      ORIGINAL - SEND TO MEDICAL RECORDS   COPY - SEND WITH PATIENT DURING TRANSFER

## 2024-07-17 NOTE — ED PROVIDER NOTES
"History  Chief Complaint   Patient presents with    Psychiatric Evaluation     Pt reports \"[employer] talked me down from a ledge, in an attempt to commit suicide, pt also states that he has a lot going on and is tired of hurting people\".        Psychiatric Evaluation      Prior to Admission Medications   Prescriptions Last Dose Informant Patient Reported? Taking?   NIFEdipine ER (ADALAT CC) 30 MG 24 hr tablet   Yes Yes   Sig: Take 30 mg by mouth daily Pt unsure of dose   amLODIPine (NORVASC) 5 mg tablet   Yes No   Sig: Take 5 mg by mouth daily   Patient not taking: Reported on 2024   losartan (COZAAR) 25 mg tablet Past Week  Yes Yes   Sig: Take 25 mg by mouth daily Pt unsure of dose   methocarbamol (ROBAXIN) 750 mg tablet   No No   Sig: Take 1 tablet (750 mg total) by mouth 3 (three) times a day as needed for muscle spasms   Patient not taking: Reported on 2024   naproxen (NAPROSYN) 500 mg tablet   No No   Sig: Take 1 tablet (500 mg total) by mouth 2 (two) times a day as needed for mild pain or moderate pain   Patient not taking: Reported on 2024   ondansetron (ZOFRAN-ODT) 4 mg disintegrating tablet   No No   Sig: Take 1 tablet (4 mg total) by mouth every 6 (six) hours as needed for nausea or vomiting   Patient not taking: Reported on 2024   oxyCODONE-acetaminophen (PERCOCET) 5-325 mg per tablet   No No   Sig: Take 1 tablet by mouth every 6 (six) hours as needed for severe pain for up to 8 dosesMax Daily Amount: 4 tablets   Patient not taking: Reported on 2024   predniSONE 10 mg tablet   No No   Sitab po qd f3d, then 3tab po qd f3d, then 2tab po qd f3d, then 1tab po qd f3d, then 1/2tab po qd f2d   Patient not taking: Reported on 2024      Facility-Administered Medications: None       Past Medical History:   Diagnosis Date    Asthma     Hypertension        Past Surgical History:   Procedure Laterality Date    CHOLECYSTECTOMY         History reviewed. No pertinent family history.  I have " reviewed and agree with the history as documented.    E-Cigarette/Vaping    E-Cigarette Use Current Every Day User      E-Cigarette/Vaping Substances     Social History     Tobacco Use    Smoking status: Never    Smokeless tobacco: Never   Vaping Use    Vaping status: Every Day   Substance Use Topics    Alcohol use: Never    Drug use: Yes     Types: Marijuana       Review of Systems    Physical Exam  Physical Exam    Vital Signs  ED Triage Vitals   Temperature Pulse Respirations Blood Pressure SpO2   07/17/24 0312 07/17/24 0312 07/17/24 0312 07/17/24 0312 07/17/24 0312   98.8 °F (37.1 °C) 91 18 (S) (!) 183/117 100 %      Temp Source Heart Rate Source Patient Position - Orthostatic VS BP Location FiO2 (%)   07/17/24 0312 07/17/24 0312 07/17/24 0312 07/17/24 0312 --   Oral Monitor Sitting Right arm       Pain Score       07/17/24 0802       No Pain           Vitals:    07/17/24 0312 07/17/24 0400 07/17/24 0802 07/17/24 1209   BP: (S) (!) 183/117 (!) 180/104 143/70 134/70   Pulse: 91 88 81 86   Patient Position - Orthostatic VS: Sitting Lying Lying Lying         Visual Acuity      ED Medications  Medications   losartan (COZAAR) tablet 25 mg (25 mg Oral Given 7/17/24 0932)   NIFEdipine (PROCARDIA XL) 24 hr tablet 30 mg (30 mg Oral Given 7/17/24 0932)       Diagnostic Studies  Results Reviewed       Procedure Component Value Units Date/Time    Rapid drug screen, urine [[id number]]  (Normal) Collected: 07/17/24 0342    Lab Status: Final result Specimen: Urine, Clean Catch Updated: 07/17/24 0529     Amph/Meth UR Negative     Barbiturate Ur Negative     Benzodiazepine Urine Negative     Cocaine Urine Negative     Methadone Urine Negative     Opiate Urine Negative     PCP Ur Negative     THC Urine Negative     Oxycodone Urine Negative     Fentanyl Urine Negative     HYDROCODONE URINE Negative    Narrative:      FOR MEDICAL PURPOSES ONLY.   IF CONFIRMATION NEEDED PLEASE CONTACT THE LAB WITHIN 5 DAYS.    Drug Screen Cutoff  Levels:  AMPHETAMINE/METHAMPHETAMINES  1000 ng/mL  BARBITURATES     200 ng/mL  BENZODIAZEPINES     200 ng/mL  COCAINE      300 ng/mL  METHADONE      300 ng/mL  OPIATES      300 ng/mL  PHENCYCLIDINE     25 ng/mL  THC       50 ng/mL  OXYCODONE      100 ng/mL  FENTANYL      5 ng/mL  HYDROCODONE     300 ng/mL    POCT alcohol breath test [[id number]]  (Normal) Resulted: 07/17/24 0340    Lab Status: Final result Updated: 07/17/24 0340     EXTBreath Alcohol 0.00                   No orders to display              Procedures  ECG 12 Lead Documentation Only    Date/Time: 7/17/2024 1:48 PM    Performed by: Ana María Baldwin DO  Authorized by: Ana María Baldwin DO    Indications / Diagnosis:  Assessment of QT interval for initiation of antipsychotic  ECG reviewed by me, the ED Provider: yes    Patient location:  ED  Interpretation:     Interpretation: normal    Rate:     ECG rate:  76    ECG rate assessment: normal    Rhythm:     Rhythm: sinus rhythm    Ectopy:     Ectopy: none    QRS:     QRS axis:  Normal  Conduction:     Conduction: normal    ST segments:     ST segments:  Normal  T waves:     T waves: normal             ED Course                                 SBIRT 20yo+      Flowsheet Row Most Recent Value   Initial Alcohol Screen: US AUDIT-C     1. How often do you have a drink containing alcohol? 0 Filed at: 07/17/2024 0314   2. How many drinks containing alcohol do you have on a typical day you are drinking?  0 Filed at: 07/17/2024 0314   3a. Male UNDER 65: How often do you have five or more drinks on one occasion? 0 Filed at: 07/17/2024 0314   Audit-C Score 0 Filed at: 07/17/2024 0314   JANELLE: How many times in the past year have you...    Used an illegal drug or used a prescription medication for non-medical reasons? Never Filed at: 07/17/2024 0314                      Medical Decision Making  Amount and/or Complexity of Data Reviewed  Labs: ordered.    Risk  Prescription drug management.  Decision regarding  hospitalization.                 Disposition  Final diagnoses:   Depression with suicidal ideation     Time reflects when diagnosis was documented in both MDM as applicable and the Disposition within this note       Time User Action Codes Description Comment    7/17/2024  3:36 AM Sebastián Ram Add [F32.A,  R45.851] Depression with suicidal ideation           ED Disposition       ED Disposition   Transfer to Behavioral Health Condition   --    Date/Time   Wed Jul 17, 2024 0426    Comment   Kobi Rodriguez should be transferred out to a psychiatric facility and has been medically cleared.               MD Documentation      Flowsheet Row Most Recent Value   Sending MD Ram          Follow-up Information    None         Patient's Medications   Discharge Prescriptions    No medications on file       No discharge procedures on file.    PDMP Review       None            ED Provider  Electronically Signed by             Ana María Baldwin DO  07/17/24 8931

## 2024-07-17 NOTE — PLAN OF CARE
Problem: Ineffective Coping  Goal: Cooperates with admission process  Description: Interventions:   - Complete admission process  Outcome: Not Progressing  Goal: Identifies ineffective coping skills  Outcome: Not Progressing  Goal: Identifies healthy coping skills  Outcome: Not Progressing  Goal: Demonstrates healthy coping skills  Outcome: Not Progressing  Goal: Participates in unit activities  Description: Interventions:  - Provide therapeutic environment   - Provide required programming   - Redirect inappropriate behaviors   Outcome: Not Progressing     Problem: Risk for Self Injury/Neglect  Goal: Treatment Goal: Remain safe during length of stay, learn and adopt new coping skills, and be free of self-injurious ideation, impulses and acts at the time of discharge  Outcome: Not Progressing  Goal: Attend and participate in unit activities, including therapeutic, recreational, and educational groups  Description: Interventions:  - Provide therapeutic and educational activities daily, encourage attendance and participation, and document same in the medical record  - Obtain collateral information, encourage visitation and family involvement in care   Outcome: Not Progressing  Goal: Recognize maladaptive responses and adopt new coping mechanisms  Outcome: Not Progressing     Problem: Depression  Goal: Verbalize thoughts and feelings  Description: Interventions:  - Assess and re-assess patient's level of risk   - Engage patient in 1:1 interactions, daily, for a minimum of 15 minutes   - Encourage patient to express feelings, fears, frustrations, hopes   Outcome: Not Progressing  Goal: Refrain from harming self  Description: Interventions:  - Monitor patient closely, per order   - Supervise medication ingestion, monitor effects and side effects   Outcome: Not Progressing  Goal: Refrain from isolation  Description: Interventions:  - Develop a trusting relationship   - Encourage socialization   Outcome: Not Progressing

## 2024-07-17 NOTE — ED PROVIDER NOTES
"History  Chief Complaint   Patient presents with    Psychiatric Evaluation     Pt reports \"[employer]D talked me down from a ledge, in an attempt to commit suicide, pt also states that he has a lot going on and is tired of hurting people\".      Presents with police after suicidal threats.  Patient sent a text message to his significant other tonight that he was going to jump from a ledge.  Patient was found at the [facility] parking deck on the top floor.  Police were successfully able to talk the patient off the ledge and bring him here for evaluation.  Patient tells me that he has a lot going on, is depressed and started having suicidal thoughts with plan recently.  Patient was in psychiatric care several years ago but states it did not help him.  He is not currently on any psychiatric medications but only medications for his blood pressure.  He denies any drugs, alcohol tonight.  He denies any hallucinations, homicidal ideation, intent or plan, delusions or paranoia.      History provided by:  Patient and significant other   used: No    Psychiatric Evaluation  Presenting symptoms: suicidal thoughts and suicidal threats    Presenting symptoms: no hallucinations    Associated symptoms: no abdominal pain, no chest pain and no headaches        Prior to Admission Medications   Prescriptions Last Dose Informant Patient Reported? Taking?   NIFEdipine ER (ADALAT CC) 30 MG 24 hr tablet   Yes Yes   Sig: Take 30 mg by mouth daily Pt unsure of dose   amLODIPine (NORVASC) 5 mg tablet   Yes No   Sig: Take 5 mg by mouth daily   Patient not taking: Reported on 1/14/2024   losartan (COZAAR) 25 mg tablet Past Week  Yes Yes   Sig: Take 25 mg by mouth daily Pt unsure of dose   methocarbamol (ROBAXIN) 750 mg tablet   No No   Sig: Take 1 tablet (750 mg total) by mouth 3 (three) times a day as needed for muscle spasms   Patient not taking: Reported on 1/14/2024   naproxen (NAPROSYN) 500 mg tablet   No No   Sig: " Take 1 tablet (500 mg total) by mouth 2 (two) times a day as needed for mild pain or moderate pain   Patient not taking: Reported on 2024   ondansetron (ZOFRAN-ODT) 4 mg disintegrating tablet   No No   Sig: Take 1 tablet (4 mg total) by mouth every 6 (six) hours as needed for nausea or vomiting   Patient not taking: Reported on 2024   oxyCODONE-acetaminophen (PERCOCET) 5-325 mg per tablet   No No   Sig: Take 1 tablet by mouth every 6 (six) hours as needed for severe pain for up to 8 dosesMax Daily Amount: 4 tablets   Patient not taking: Reported on 2024   predniSONE 10 mg tablet   No No   Sitab po qd f3d, then 3tab po qd f3d, then 2tab po qd f3d, then 1tab po qd f3d, then 1/2tab po qd f2d   Patient not taking: Reported on 2024      Facility-Administered Medications: None       Past Medical History:   Diagnosis Date    Asthma     Hypertension        Past Surgical History:   Procedure Laterality Date    CHOLECYSTECTOMY         History reviewed. No pertinent family history.  I have reviewed and agree with the history as documented.    E-Cigarette/Vaping    E-Cigarette Use Current Every Day User      E-Cigarette/Vaping Substances     Social History     Tobacco Use    Smoking status: Never    Smokeless tobacco: Never   Vaping Use    Vaping status: Every Day   Substance Use Topics    Alcohol use: Never    Drug use: Yes     Types: Marijuana       Review of Systems   Constitutional:  Negative for fever.   Respiratory:  Negative for cough and shortness of breath.    Cardiovascular:  Negative for chest pain.   Gastrointestinal:  Negative for abdominal pain, nausea and vomiting.   Skin:  Negative for wound.   Allergic/Immunologic: Negative for immunocompromised state.   Neurological:  Negative for headaches.   Psychiatric/Behavioral:  Positive for suicidal ideas. Negative for hallucinations.    All other systems reviewed and are negative.      Physical Exam  Physical Exam  Vitals and nursing note  reviewed.   Constitutional:       General: He is not in acute distress.     Appearance: He is well-developed. He is not ill-appearing, toxic-appearing or diaphoretic.   HENT:      Head: Normocephalic and atraumatic.      Right Ear: External ear normal.      Left Ear: External ear normal.      Nose: No congestion.   Eyes:      General: No scleral icterus.        Right eye: No discharge.         Left eye: No discharge.      Conjunctiva/sclera: Conjunctivae normal.      Right eye: Right conjunctiva is not injected.      Left eye: Left conjunctiva is not injected.      Pupils: Pupils are equal, round, and reactive to light.   Neck:      Trachea: No tracheal deviation.   Cardiovascular:      Rate and Rhythm: Normal rate and regular rhythm.      Pulses: Normal pulses.      Heart sounds: Normal heart sounds. No murmur heard.     No friction rub.   Pulmonary:      Effort: Pulmonary effort is normal. No respiratory distress.      Breath sounds: Normal breath sounds. No stridor. No wheezing or rales.   Musculoskeletal:         General: No tenderness or deformity.   Skin:     General: Skin is warm and dry.      Capillary Refill: Capillary refill takes less than 2 seconds.      Coloration: Skin is not pale.      Findings: No erythema or rash.   Neurological:      Mental Status: He is alert and oriented to person, place, and time.      Motor: No abnormal muscle tone.   Psychiatric:         Attention and Perception: He is attentive.         Mood and Affect: Affect is blunt.         Speech: Speech normal.         Behavior: Behavior normal.         Thought Content: Thought content is not paranoid or delusional. Thought content includes suicidal ideation. Thought content does not include homicidal ideation. Thought content includes suicidal plan. Thought content does not include homicidal plan.         Cognition and Memory: Memory is not impaired.         Vital Signs  ED Triage Vitals [07/17/24 0312]   Temperature Pulse Respirations  Blood Pressure SpO2   98.8 °F (37.1 °C) 91 18 (S) (!) 183/117 100 %      Temp Source Heart Rate Source Patient Position - Orthostatic VS BP Location FiO2 (%)   Oral Monitor Sitting Right arm --      Pain Score       --           Vitals:    07/17/24 0312 07/17/24 0400   BP: (S) (!) 183/117 (!) 180/104   Pulse: 91 88   Patient Position - Orthostatic VS: Sitting Lying         Visual Acuity      ED Medications  Medications - No data to display    Diagnostic Studies  Results Reviewed       Procedure Component Value Units Date/Time    Rapid drug screen, urine [[id number]] Collected: 07/17/24 0342    Lab Status: In process Specimen: Urine, Clean Catch Updated: 07/17/24 0345    POCT alcohol breath test [[id number]]  (Normal) Resulted: 07/17/24 0340    Lab Status: Final result Updated: 07/17/24 0340     EXTBreath Alcohol 0.00                   No orders to display              Procedures  Procedures         ED Course                                 SBIRT 20yo+      Flowsheet Row Most Recent Value   Initial Alcohol Screen: US AUDIT-C     1. How often do you have a drink containing alcohol? 0 Filed at: 07/17/2024 0314   2. How many drinks containing alcohol do you have on a typical day you are drinking?  0 Filed at: 07/17/2024 0314   3a. Male UNDER 65: How often do you have five or more drinks on one occasion? 0 Filed at: 07/17/2024 0314   Audit-C Score 0 Filed at: 07/17/2024 0314   JANELLE: How many times in the past year have you...    Used an illegal drug or used a prescription medication for non-medical reasons? Never Filed at: 07/17/2024 0314                      Medical Decision Making  3:34 AM  Patient in no acute distress at this time.  Will obtain a basic behavioral health workup but patient appears medically cleared at this time.  He is hypertensive but he has not been taking all of his medications recently.  States he ran out of one of them a few days ago.  Will continue to monitor this, look through his chart and  restart his home medications.  Will have crisis evaluate.  Given patient's suicidal ideation with attempt tonight, I strongly recommend that he come in for inpatient evaluation and treatment.    4:40 AM  Patient met with ED crisis.  201 signed.  Placement pending.  Home medications ordered.    Amount and/or Complexity of Data Reviewed  Labs: ordered.    Risk  Decision regarding hospitalization.                 Disposition  Final diagnoses:   Depression with suicidal ideation     Time reflects when diagnosis was documented in both MDM as applicable and the Disposition within this note       Time User Action Codes Description Comment    7/17/2024  3:36 AM Sebastián Ram Add [F32.A,  R45.851] Depression with suicidal ideation           ED Disposition       ED Disposition   Transfer to Behavioral Health Condition   --    Date/Time   Wed Jul 17, 2024 3440    Comment   Kobi Rodriguez should be transferred out to a psychiatric facility and has been medically cleared.               Follow-up Information    None         Patient's Medications   Discharge Prescriptions    No medications on file       No discharge procedures on file.    PDMP Review       None            ED Provider  Electronically Signed by             Sebastián Ram Jr.,   07/17/24 2119

## 2024-07-17 NOTE — ED NOTES
Patient requesting local placement, preferably [facility]. He has no insurance. [facility] faxed referral to Intake for review later this morning.

## 2024-07-17 NOTE — NURSING NOTE
Pt assessed on admission with elevated BP. Received daily antihypertensive meds in AM prior to arrival. Verbalized feeling asymptomatic. RN reached out to [facility] on call about BP. Before plan of action was concluded pt requested not to take a med for BP; requested to have BP rechecked before proceeding with additional med. Pt educated in risks of BP elevation and acknowledged risks of refusal.

## 2024-07-18 PROBLEM — Z00.8 MEDICAL CLEARANCE FOR PSYCHIATRIC ADMISSION: Status: ACTIVE | Noted: 2024-07-18

## 2024-07-18 PROBLEM — I10 HTN (HYPERTENSION): Status: ACTIVE | Noted: 2024-07-18

## 2024-07-18 PROBLEM — F39 UNSPECIFIED MOOD (AFFECTIVE) DISORDER (HCC): Status: ACTIVE | Noted: 2024-07-18

## 2024-07-18 PROBLEM — E66.9 OBESITY: Status: ACTIVE | Noted: 2024-07-18

## 2024-07-18 PROBLEM — D72.829 LEUKOCYTOSIS: Status: ACTIVE | Noted: 2024-07-18

## 2024-07-18 LAB
25(OH)D3 SERPL-MCNC: 20.9 NG/ML (ref 30–100)
BASOPHILS # BLD AUTO: 0.07 THOUSANDS/ÂΜL (ref 0–0.1)
BASOPHILS NFR BLD AUTO: 1 % (ref 0–1)
EOSINOPHIL # BLD AUTO: 0.31 THOUSAND/ÂΜL (ref 0–0.61)
EOSINOPHIL NFR BLD AUTO: 3 % (ref 0–6)
ERYTHROCYTE [DISTWIDTH] IN BLOOD BY AUTOMATED COUNT: 12.9 % (ref 11.6–15.1)
FOLATE SERPL-MCNC: 14.7 NG/ML
HCT VFR BLD AUTO: 45.4 % (ref 36.5–49.3)
HGB BLD-MCNC: 15.2 G/DL (ref 12–17)
IMM GRANULOCYTES # BLD AUTO: 0.03 THOUSAND/UL (ref 0–0.2)
IMM GRANULOCYTES NFR BLD AUTO: 0 % (ref 0–2)
LYMPHOCYTES # BLD AUTO: 1.71 THOUSANDS/ÂΜL (ref 0.6–4.47)
LYMPHOCYTES NFR BLD AUTO: 16 % (ref 14–44)
MCH RBC QN AUTO: 29.6 PG (ref 26.8–34.3)
MCHC RBC AUTO-ENTMCNC: 33.5 G/DL (ref 31.4–37.4)
MCV RBC AUTO: 89 FL (ref 82–98)
MONOCYTES # BLD AUTO: 0.39 THOUSAND/ÂΜL (ref 0.17–1.22)
MONOCYTES NFR BLD AUTO: 4 % (ref 4–12)
NEUTROPHILS # BLD AUTO: 8.22 THOUSANDS/ÂΜL (ref 1.85–7.62)
NEUTS SEG NFR BLD AUTO: 76 % (ref 43–75)
NRBC BLD AUTO-RTO: 0 /100 WBCS
PLATELET # BLD AUTO: 406 THOUSANDS/UL (ref 149–390)
PMV BLD AUTO: 10.1 FL (ref 8.9–12.7)
RBC # BLD AUTO: 5.13 MILLION/UL (ref 3.88–5.62)
TREPONEMA PALLIDUM IGG+IGM AB [PRESENCE] IN SERUM OR PLASMA BY IMMUNOASSAY: NORMAL
TSH SERPL DL<=0.05 MIU/L-ACNC: 1.81 UIU/ML (ref 0.45–4.5)
VIT B12 SERPL-MCNC: 284 PG/ML (ref 180–914)
WBC # BLD AUTO: 10.73 THOUSAND/UL (ref 4.31–10.16)

## 2024-07-18 PROCEDURE — 86780 TREPONEMA PALLIDUM: CPT

## 2024-07-18 PROCEDURE — 99253 IP/OBS CNSLTJ NEW/EST LOW 45: CPT | Performed by: NURSE PRACTITIONER

## 2024-07-18 PROCEDURE — 82607 VITAMIN B-12: CPT

## 2024-07-18 PROCEDURE — 82306 VITAMIN D 25 HYDROXY: CPT

## 2024-07-18 PROCEDURE — 84443 ASSAY THYROID STIM HORMONE: CPT

## 2024-07-18 PROCEDURE — 99223 1ST HOSP IP/OBS HIGH 75: CPT | Performed by: STUDENT IN AN ORGANIZED HEALTH CARE EDUCATION/TRAINING PROGRAM

## 2024-07-18 PROCEDURE — 85025 COMPLETE CBC W/AUTO DIFF WBC: CPT

## 2024-07-18 PROCEDURE — 82746 ASSAY OF FOLIC ACID SERUM: CPT

## 2024-07-18 RX ORDER — ESCITALOPRAM OXALATE 5 MG/1
5 TABLET ORAL DAILY
Status: DISCONTINUED | OUTPATIENT
Start: 2024-07-18 | End: 2024-07-22

## 2024-07-18 RX ADMIN — ESCITALOPRAM OXALATE 5 MG: 5 TABLET, FILM COATED ORAL at 12:56

## 2024-07-18 RX ADMIN — LOSARTAN POTASSIUM 50 MG: 50 TABLET, FILM COATED ORAL at 08:28

## 2024-07-18 RX ADMIN — NIFEDIPINE 30 MG: 30 TABLET, FILM COATED, EXTENDED RELEASE ORAL at 08:28

## 2024-07-18 NOTE — ASSESSMENT & PLAN NOTE
Mild, afebrile, likely due to dehydration  Encourage oral intake and repeat CBC with differential in 2 days

## 2024-07-18 NOTE — TREATMENT PLAN
TREATMENT PLAN REVIEW - Behavioral Health Kobi Rodriguez 23 y.o. 2000 male MRN: [id number]    [facility] 3P BEHAVIORAL HLTH Room / Bed: [facility] [place] 377/[facility] [place] 377-01 Encounter: [id number]        Admit Date/Time:  7/17/2024  4:05 PM    Treatment Team:   MD Iram El RN David McCoy, RN Danielle Merk Karissa Marie Kormandy, CRNP    Diagnosis: Principal Problem:    Unspecified mood (affective) disorder (HCC)      Patient Strengths/Assets: cooperative, communication skills, family ties, patient is on a voluntary commitment, patient is willing to work on problems, Confucianism affiliation, stable/recent employment      Patient Barriers/Limitations: family conflict, low self esteem, relationship issues    Short Term Goals: decrease in depressive symptoms, decrease in anxiety symptoms, decrease in suicidal thoughts, ability to stay safe on the unit, ability to stay free from restraints, improvement in ability to express basic needs, sleep improvement, tolerate medications, increase in group attendance, increase in group participation, increase in socialization with peers on the unit, acceptance of need for psychiatric treatment, acceptance of psychiatric medications    Long Term Goals: improvement in depression, improvement in anxiety, resolution of depressive symptoms, able to express basic needs, acceptance of need for psychiatric medications, acceptance of need for psychiatric treatment, acceptance of need for psychiatric follow up after discharge, adequate self care, appropriate interaction with peers, appropriate interaction with family    Progress Towards Goals: starting psychiatric medications as prescribed    Recommended Treatment: medication management, patient medication education, group therapy, milieu therapy, continued Behavioral Health psychiatric evaluation/assessment process     Treatment Frequency: daily medication monitoring, group and milieu  therapy daily, monitoring through interdisciplinary rounds, monitoring through weekly patient care conferences    Expected Discharge Date: 5 days - 7/23/2024    Discharge Plan: discharge to home    Treatment Plan Created/Updated By: Yunier Menendez MD
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
## 2024-07-18 NOTE — ASSESSMENT & PLAN NOTE
Blood pressure elevated since arrival  PTA medications include losartan 25 mg daily, Procardia XL 24-hour tablet 30 mg daily, and Norvasc 5 mg daily  Increased losartan to 50 mg daily  Continue Procardia XL 24-hour tablet 30 mg daily  Hold Norvasc  Monitor BP and adjust medications as needed

## 2024-07-18 NOTE — NURSING NOTE
Pt denies SI, HI, AH and VH. Pt is visible in the milieu, but mainly isolative to self. Pt has no complaints or concerns. Pt attending groups. Pt is calm and cooperative. Medication and meal compliant.

## 2024-07-18 NOTE — ASSESSMENT & PLAN NOTE
Admission labs: CBC and TSH acceptable  CMP and lipid panel need to be collected  Folate, B12, Vitamin D 25 hydroxy labs pending  Vitals stable   UDS negative   EKG reveals NSR, 86 bpm   Patient is medically cleared for admission to U and treatment of underlying psychiatric illness based on available results  Please contact [facility] with any questions or concerns

## 2024-07-18 NOTE — DISCHARGE INSTR - OTHER ORDERS
[facility] NURSING/ COMMUNITY OUTREACH DEPARTMENT    [facility]:  [address] [place] [address] (Mon/Wed)  [facility]: [address] [facility] (Mon 12:30-1:30pm)  [facility]: Leonel Galeas and [facility] (Tues/Thurs)  [facility]: [address] [facility] (Tues 1-2pm)  [place] Nurse: Bradly Yuen -[phone] ©                         [facility]: [address] [place] [id number](Tues/Wed am)          [facility]: [address] [facility] (Tues)                   [facility]: [address] [facility] [facility] (Tues/Wed pm)                        [facility]: [address] [facility] (Tues 4:30-6:30)  [place] Nurse: Mary Burgos, RN: [phone] ©    [facility]: [address] [facility] Nurse: Carmela Herbert RN: [phone] ©                     Director of [place] Nursing: Cristela Young: [phone] office                                                 [email or web address]                                             Free Mammograms and Pap Smears:                              Marilyn Kunz, RN: [phone] (English, Czech, English)                               Vivian Hampton, Advocate: [phone] (English and English)      [facility]: 12-1pm every Tuesday, Wednesday, Thursday         [address] [facility]  [facility]: 12”30-1”30 every Friday, Saturday, Sunday, Monday         [address] [facility]: 1-5 Tuesday, Wednesday, Thursday; 11-3 Fridayand Saturday        [address] [facility] food bank: every Saturday from 9-11am first 100 people (take ID)  [facility] food bank: weekdays 9am- first 30 people- may go 1 time/month

## 2024-07-18 NOTE — PROGRESS NOTES
07/18/24 1552   Team Meeting   Meeting Type Tx Team Meeting   Initial Conference Date 07/18/24   Next Conference Date 08/18/24   Team Members Present   Team Members Present Physician;Nurse;   Physician Team Member Umesh Lopez   Nursing Team Member Vega   Social Work Team Member Alvaro ARENAS   Patient/Family Present   Patient Present Yes   Patient's Family Present No     Treatment plan reviewed with patient. Patient is in agreement with treatment plan.

## 2024-07-18 NOTE — PROGRESS NOTES
07/18/24 0805   Team Meeting   Meeting Type Daily Rounds   Team Members Present   Team Members Present Physician;Nurse;   Physician Team Member Kenna Ramey Burke CRNP   Nursing Team Member Kirk   Social Work Team Member Alvaro ARENAS   Patient/Family Present   Patient Present No   Patient's Family Present No     Patient's a new admission, 201. Increased depression and anxiety. Patient had suicide ideations of  found on the building and brought in by police. No insurance. Discharge to home when he has completed his treatment.

## 2024-07-18 NOTE — NURSING NOTE
"Pt is calm and cooperative. Visible most of the evening utilizing the pt phone. Denies SI, HI, AH, VH, depression, and anxiety. Pt stated \"I'm okay\". Declined scheduled melatonin, pt stated \"It doesn't work for me\". Pt made aware of other PRN medication if needed. BP at 2022 noted to be 173/102. BP reassessed at 2235 manually, 136/90. Pt stated \"My blood pressure has been up and down like that since I've been 18. It fluctuates with my mental health\". Received snack and fluids. Denies further unmet needs at this time.   "

## 2024-07-18 NOTE — SOCIAL WORK
07/18/24 1540   Referral Data   Referral Source Family   Referral Reason Psych   County Information   [facility]   Readmission Root Cause   30 Day Readmission No   Patient Information   Mental Status Alert   Primary Caregiver Self   Support System Immediate family   Legal Information   Tx Plan Signed Yes   Current Status: 201   Legal Issues Patient denies   Activities of Daily Living Prior to Admission   Functional Status Independent   Assistive Device No device needed   Living Arrangement House;Lives with someone   Ambulation Independent   Access to Firearms   Access to Firearms No   Income Information   Income Source Employed  ([employer] and [employer])   Means of Transportation   Means of Transport to Appts: Drives Self      07/18/24 1542   Admission   Admission Source Emergency department   Status of Admission 201   Why are you here? Found on the eldge of a building   Events leading to  inpatient admission Increased depression and anxiety   Patient Strengths Adapts well;Motivated;Physically healthy;Cooperative;Financially secure;Good support system;Reasoning ability;Negotiates basic needs;Self reliant   Patient Limitations Other (Comment)  (Limited resources)   Release of Information Signed Yes  ([facility], Significant Other, Sandy MEJIA)     Admission Status    Status of admission 201   [facility]     Patient Intake   Address to discharge to [address] [place] [address]   Living Arrangement Lives with family   Can patient return home Yes   Patient's Telephone Number [phone]   Patient's e-mail Address [email or web address]   Insurance None    PCP None    Education 12th grade   Type of work [employer]    History None    Access to Firearms No   Marital Status/Children Single/ 1 child    Spirituality/Yazidism    Transportation Self/Ride Share   [facility]      Patient History   Presenting Problem Patient  presents for increased depression and anxiety. Patient recently relocated from [place]. Patient reports feeling overwhelmed at times. Patient reports history of SIB but punching inanimate obejcvs. He denies history of SA or SI.    Stressor/Trigger Has an infant child, finances are stressful, recent relocation to [place]    Treatment History Prior history of OP therapy   Current psychiatrist/therapist None    ACT/ICM None    Family History of Mental Health Patient denies    Suicide Attempts Patient denies    Legal Issues Patient denies    Trauma/Psychosocial loss Patient denies      Substance Abuse Assessment   UDS:Negative   Audit Score:0  Nicotine/Tobacco:   Substance First use Last Use and amount Frequency Amount Used How long Longest period of sobriety and when Method of use   THC            Heroin            Cocaine            ETOH            Meth            Benzos            Other:            History of RISHABH    Family History of RISHABH    Prior Inpatient RISHABH Treatment    Current Outpatient treatment    Response to Referral      Referrals/ROIs   Referrals Needed OP Psych services, [facility], PCP   ROIs Signed [facility], Sandy Hedrick     This writer discussed referral for [facility] funding for outpatient mental health treatment which patient is in agreement with.

## 2024-07-18 NOTE — CONSULTS
[facility]  Consult  Name: Kobi Rodriguez 23 y.o. male I MRN: [id number]  Unit/Bed#: U 377-01 I Date of Admission: 7/17/2024   Date of Service: 7/18/2024 I Hospital Day: 1    Inpatient consult for Medical Clearance for  patient  Consult performed by: BISHOP Middleton  Consult ordered by: Yunier Purvis MD        Assessment & Plan   Medical clearance for psychiatric admission  Assessment & Plan  Admission labs: CBC and TSH acceptable  CMP and lipid panel need to be collected  Folate, B12, Vitamin D 25 hydroxy labs pending  Vitals stable   UDS negative   EKG reveals NSR, 86 bpm   Patient is medically cleared for admission to [facility] and treatment of underlying psychiatric illness based on available results  Please contact [facility] with any questions or concerns    HTN (hypertension)  Assessment & Plan  Blood pressure elevated since arrival  PTA medications include losartan 25 mg daily, Procardia XL 24-hour tablet 30 mg daily, and Norvasc 5 mg daily  Increased losartan to 50 mg daily  Continue Procardia XL 24-hour tablet 30 mg daily  Hold Norvasc  Monitor BP and adjust medications as needed    Leukocytosis  Assessment & Plan  Mild, afebrile, likely due to dehydration  Encourage oral intake and repeat CBC with differential in 2 days    Obesity  Assessment & Plan  Would benefit from weight loss and therapeutic lifestyle changes  Education and counseling as tolerated   Consider nutrition evaluation       * Unspecified mood (affective) disorder (HCC)  Assessment & Plan  [facility]  [facility] per primary service              Recommendations for Discharge:  SLIM will sign off - please call with questions or concerns.  Follow up with PCP upon discharge.     Counseling / Coordination of Care Time: 30 minutes.  Greater than 50% of total time spent on patient counseling and coordination of care.    Collaboration of Care: Were Recommendations Directly Discussed with  Primary Treatment Team? - Yes     History of Present Illness:    Kobi Rodriguez is a 23 y.o. male who is originally admitted to the psychiatric service due to depression and suicide ideation with plan. We are consulted for medical clearance for psychiatric hospitalization and medical management.  Initially, patient was taken to [facility] emergency department via the police.  Patient was found on the top of the parking deck at [facility] with plan to jump.  He was successfully talked down by the police.  In the emergency department he noted increased stressors at home.  Apparently he has a 5-month-old daughter and a significant other who was recently diagnosed with depression and anxiety.  Per notes, patient significant other had a panic attack and passed out earlier in the day. Patient was seen by Crisis. He signed a 201 [facility] Currently, he is calm and cooperative. He offers no complaints.     Review of Systems:    Review of Systems   Constitutional:  Negative for appetite change and chills.   HENT:  Negative for congestion and sore throat.    Eyes:  Negative for visual disturbance.   Respiratory:  Negative for cough and shortness of breath.    Cardiovascular:  Negative for chest pain.   Gastrointestinal:  Negative for abdominal pain, constipation, diarrhea, nausea and vomiting.   Genitourinary:  Negative for difficulty urinating.   Musculoskeletal:  Negative for gait problem.   Skin:  Negative for wound.   Neurological:  Negative for dizziness, light-headedness and headaches.       Past Medical and Surgical History:     Past Medical History:   Diagnosis Date    Asthma     Hypertension        Past Surgical History:   Procedure Laterality Date    CHOLECYSTECTOMY         Meds/Allergies:    all medications and allergies reviewed    Allergies: No Known Allergies    Social History:     Marital Status: Single    Substance Use History:   Social History  "    Substance and Sexual Activity   Alcohol Use Never     Social History     Tobacco Use   Smoking Status Every Day    Passive exposure: Never   Smokeless Tobacco Never     Social History     Substance and Sexual Activity   Drug Use Not Currently    Types: Marijuana       Family History:    History reviewed. No pertinent family history.    Physical Exam:     Vitals:   Blood Pressure: 156/76 (07/18/24 1128)  Pulse: 71 (07/18/24 1128)  Temperature: 97.8 °F (36.6 °C) (07/18/24 0734)  Temp Source: Temporal (07/18/24 0734)  Respirations: 16 (07/18/24 1128)  Height: 5' 9\" (175.3 cm) (07/17/24 1605)  Weight - Scale: 116 kg (256 lb 3.2 oz) (07/17/24 1605)  SpO2: 99 % (07/18/24 0734)    Physical Exam  Vitals and nursing note reviewed.   Constitutional:       General: He is not in acute distress.     Appearance: He is obese. He is not toxic-appearing or diaphoretic.   HENT:      Head: Normocephalic.      Mouth/Throat:      Mouth: Mucous membranes are moist.   Eyes:      Conjunctiva/sclera: Conjunctivae normal.   Cardiovascular:      Rate and Rhythm: Normal rate.   Pulmonary:      Effort: Pulmonary effort is normal.      Breath sounds: Normal breath sounds.   Abdominal:      General: Bowel sounds are normal.      Palpations: Abdomen is soft.   Musculoskeletal:         General: Normal range of motion.      Cervical back: Normal range of motion.      Right lower leg: No edema.      Left lower leg: No edema.   Skin:     General: Skin is warm and dry.      Capillary Refill: Capillary refill takes less than 2 seconds.   Neurological:      Mental Status: He is alert and oriented to person, place, and time. Mental status is at baseline.   Psychiatric:         Mood and Affect: Mood is depressed. Affect is flat.         Speech: Speech normal.         Behavior: Behavior is cooperative.         Additional Data:     Lab Results:     Results from last 7 days   Lab Units 07/18/24  0937   WBC Thousand/uL 10.73*   HEMOGLOBIN g/dL 15.2 "   HEMATOCRIT % 45.4   PLATELETS Thousands/uL 406*   SEGS PCT % 76*   LYMPHO PCT % 16   MONO PCT % 4   EOS PCT % 3                 Lab Results   Component Value Date/Time    HGBA1C 5.3 10/26/2018 08:00 AM               Imaging: I have personally reviewed pertinent reports.      No orders to display       EKG, Pathology, and Other Studies Reviewed on Admission:   EKG: see above documentation    ** Please Note: This note has been constructed using a voice recognition system. **

## 2024-07-19 PROCEDURE — 99232 SBSQ HOSP IP/OBS MODERATE 35: CPT | Performed by: STUDENT IN AN ORGANIZED HEALTH CARE EDUCATION/TRAINING PROGRAM

## 2024-07-19 RX ORDER — CLONIDINE HYDROCHLORIDE 0.1 MG/1
0.2 TABLET ORAL EVERY 12 HOURS SCHEDULED
Status: DISCONTINUED | OUTPATIENT
Start: 2024-07-19 | End: 2024-07-23 | Stop reason: HOSPADM

## 2024-07-19 RX ORDER — LOSARTAN POTASSIUM 50 MG/1
50 TABLET ORAL ONCE
Status: COMPLETED | OUTPATIENT
Start: 2024-07-19 | End: 2024-07-19

## 2024-07-19 RX ORDER — LOSARTAN POTASSIUM 50 MG/1
100 TABLET ORAL DAILY
Status: DISCONTINUED | OUTPATIENT
Start: 2024-07-20 | End: 2024-07-23 | Stop reason: HOSPADM

## 2024-07-19 RX ORDER — ERGOCALCIFEROL 1.25 MG/1
50000 CAPSULE ORAL WEEKLY
Status: DISCONTINUED | OUTPATIENT
Start: 2024-07-19 | End: 2024-07-23 | Stop reason: HOSPADM

## 2024-07-19 RX ORDER — CYANOCOBALAMIN 1000 UG/ML
1000 INJECTION, SOLUTION INTRAMUSCULAR; SUBCUTANEOUS ONCE
Status: COMPLETED | OUTPATIENT
Start: 2024-07-19 | End: 2024-07-19

## 2024-07-19 RX ORDER — FOLIC ACID 1 MG/1
1 TABLET ORAL DAILY
Status: DISCONTINUED | OUTPATIENT
Start: 2024-07-19 | End: 2024-07-23 | Stop reason: HOSPADM

## 2024-07-19 RX ADMIN — LOSARTAN POTASSIUM 50 MG: 50 TABLET, FILM COATED ORAL at 08:40

## 2024-07-19 RX ADMIN — CLONIDINE HYDROCHLORIDE 0.2 MG: 0.1 TABLET ORAL at 21:48

## 2024-07-19 RX ADMIN — FOLIC ACID 1 MG: 1 TABLET ORAL at 08:45

## 2024-07-19 RX ADMIN — LOSARTAN POTASSIUM 50 MG: 50 TABLET, FILM COATED ORAL at 11:17

## 2024-07-19 RX ADMIN — CLONIDINE HYDROCHLORIDE 0.2 MG: 0.1 TABLET ORAL at 11:18

## 2024-07-19 RX ADMIN — CYANOCOBALAMIN 1000 MCG: 1000 INJECTION INTRAMUSCULAR; SUBCUTANEOUS at 08:45

## 2024-07-19 RX ADMIN — NIFEDIPINE 30 MG: 30 TABLET, FILM COATED, EXTENDED RELEASE ORAL at 08:39

## 2024-07-19 RX ADMIN — ERGOCALCIFEROL 50000 UNITS: 1.25 CAPSULE ORAL at 08:45

## 2024-07-19 RX ADMIN — ESCITALOPRAM OXALATE 5 MG: 5 TABLET, FILM COATED ORAL at 08:40

## 2024-07-19 NOTE — PROGRESS NOTES
Admission Self Assessment form was completed, signed and given to this therapist.   Work focus: Assertiveness/communication, life skills, helping family understand struggles and knowledge about mental health

## 2024-07-19 NOTE — NURSING NOTE
Patient denies SI, HI, AHs and VHs. Denies anxiety and depression. Patient is medication and meal compliant. Visible on the unit and social with peers. He denies any needs at this time.

## 2024-07-19 NOTE — QUICK NOTE
Called by psych pt b/p elevated. I increased losartan and added clonidine will cont to monitor closely. Slim will review b/p daily and adjust,

## 2024-07-19 NOTE — PROGRESS NOTES
07/19/24 0748   Team Meeting   Meeting Type Daily Rounds   Team Members Present   Team Members Present Physician;Nurse;   Physician Team Member Kenna Ramey Burke CRNP   Nursing Team Member Kirk   Social Work Team Member Alvaro ARENAS   Patient/Family Present   Patient Present No   Patient's Family Present No     Patient's compliant with medications. He's appropriate and engaged. Had a virtual visit with family. Started Lexapro. D/C next week.

## 2024-07-19 NOTE — NURSING NOTE
Patient is visible on the unit. Has spent most of the evening speaking on the phone and occasionally in the dayroom. Patient denies SI/HVA. He is pleasant and cooperative on approach and denies any unmet needs at this time. Continual rounding maintained for safety.

## 2024-07-19 NOTE — PROGRESS NOTES
Progress Note - Behavioral Health   Kobi Rodriguez 23 y.o. male MRN: [id number]  Unit/Bed#: [facility] 377-01 Encounter: [id number]    Assessment & Plan   Principal Problem:    Unspecified mood (affective) disorder (HCC)  Active Problems:    Medical clearance for psychiatric admission    Obesity    Leukocytosis    HTN (hypertension)      Recommended Treatment:      No psychopharmacologic changes necessary at this time; will continue to assess for further optimization.  Continue with current medications:  Lexapro 5 mg daily for depressive symptoms  Reach out to medical regarding patient's blood pressure. Patient is hypertensive despite losartan and procardia  Continue with group therapy, milieu therapy and occupational therapy.    Continue frequent safety checks and vitals per unit protocol.  Continue with SLIM medical management as indicated  Continue coordinating with case management regarding disposition    Legal Status: 201  Disposition: To be determined, coordinating with case management    Case discussed with treatment team.  Risks, benefits and possible side effects of Medications: Risks, benefits, and possible side effects of medications have been explained to the patient, who verbalizes understanding    ------------------------------------------------------------    Subjective: Patient's chart was reviewed, and patient's progress and plan was discussed with treatment team. Per nursing report, Kobi has been cooperative on the unit and compliant with medications.     Kobi was evaluated this morning for continuity of care. On examination, Kobi is calm, cooperative, and pleasant. He reports that he spoke to his fiance last night and his daughter, and they have both been very supportive. Patient reported that during the conversation, he received news that his grandmother was in poor health. Patient was appropriately depressed following that news, however he states that he was able to cope.  He states his mood  "is \"Better but still depressed\" today. He is introspective towards the events that brought him to the hospital and believes that he needs to be \"more open with my feelings instead of bottling them up\" and as a result he reports being less guarded with both staff and his fiancee. He states that his sleep is improving and denies any issues with appetite or energy. He denies any side effects from initiating Lexapro.  He denies suicidal ideation, passive death wish, homicidal ideation, auditory hallucinations, and visual hallucinations.    VS: Reviewed,  HTN at 183/109, otherwise within normal limits    Progress Toward Goals:  slow improvement    Psychiatric Review of Systems:  Behavior over the last 24 hours: improved  Sleep: improving  Appetite: adequate  Medication side effects: none verbalized  Medical ROS: Denies SOB, chest pain, dizziness, syncope. All other systems negative.    Vital signs in last 24 hours:  Temp:  [97.2 °F (36.2 °C)-97.9 °F (36.6 °C)] 97.2 °F (36.2 °C)  HR:  [71-85] 78  Resp:  [16] 16  BP: (156-183)/() 183/109    Mental Status Exam:    Appearance:  appears stated age, casually dressed, appropriate grooming and hygiene, and bearded   Behavior:  calm, cooperative, and pleasant   Speech:  spontaneous, normal rate, normal volume, and coherent   Mood:  \"Better but still depressed\"   Affect:  constricted   Thought Process:  Organized, logical, goal-directed   Associations: intact associations   Thought Content:  no verbalized delusions or overt paranoia   Perceptual Disturbances: no reported hallucinations and does not appear to be responding to internal stimuli at this time   Risk Potential: Suicidal ideation - None at present  Homicidal ideation - None at present  Potential for aggression - No   Sensorium:  oriented to person, place, and time/date   Memory:  recent and remote memory grossly intact   Consciousness:  alert and awake   Attention/Concentration: attention span and concentration are " age appropriate   Insight:  improving and limited   Judgment: improving and limited   Gait/Station: normal gait/station   Motor Activity: no abnormal movements     Current Medications:  Current Facility-Administered Medications   Medication Dose Route Frequency Provider Last Rate    acetaminophen  650 mg Oral Q6H PRN Yunier Purvis MD      acetaminophen  650 mg Oral Q4H PRN Yunier Purvis MD      acetaminophen  975 mg Oral Q6H PRN Yunier Purvis MD      aluminum-magnesium hydroxide-simethicone  30 mL Oral Q4H PRN Yunier Purvis MD      benztropine  1 mg Intramuscular Q4H PRN Max 6/day Yunier Purvis MD      benztropine  1 mg Oral Q4H PRN Max 6/day Yunier Purvis MD      [START ON 7/20/2024] cyanocobalamin  1,000 mcg Oral Daily BISHOP Martines      ergocalciferol  50,000 Units Oral Weekly BISHOP Martines      escitalopram  5 mg Oral Daily Yunier Purvis MD      folic acid  1 mg Oral Daily BISHOP Martines      hydrOXYzine HCL  25 mg Oral Q6H PRN Max 4/day Yunier Purvis MD      hydrOXYzine HCL  50 mg Oral Q4H PRN Max 4/day Yunier Purvis MD      Or    LORazepam  1 mg Intramuscular Q4H PRN Yunier Purvis MD      LORazepam  1 mg Oral Q4H PRN Max 6/day Yunier Purvis MD      Or    LORazepam  2 mg Intramuscular Q6H PRN Max 3/day Yunier Purvis MD      losartan  50 mg Oral Daily Emely Lindo, MASTERNP      NIFEdipine  30 mg Oral Daily Yunier Purvis MD      OLANZapine  5 mg Oral Q4H PRN Max 3/day Yunier Purvis MD      Or    OLANZapine  2.5 mg Intramuscular Q4H PRN Max 3/day Yunier Purvis MD      OLANZapine  5 mg Oral Q3H PRN Max 3/day Yunier Purvis MD      Or    OLANZapine  5 mg Intramuscular Q3H PRN Max 3/day Yunier Purvis MD      OLANZapine  2.5 mg Oral Q4H PRN Max 6/day Yunier Purvis MD      polyethylene glycol  17 g Oral Daily PRN Yunier Purvis MD      propranolol  10 mg Oral Q8H PRN  Yunier Purvis MD      senna-docusate sodium  1 tablet Oral Daily PRN Yunier Purvis MD      traZODone  50 mg Oral HS PRN Yunier Purvis MD         Behavioral Health Medications: all current active meds have been reviewed. Changes as in plan section above.    Laboratory results:  I have personally reviewed all pertinent laboratory/tests results.   Recent Results (from the past 48 hour(s))   ECG 12 lead    Collection Time: 07/17/24  1:42 PM   Result Value Ref Range    Ventricular Rate 86 BPM    Atrial Rate 86 BPM    AL Interval 158 ms    QRSD Interval 106 ms    QT Interval 394 ms    QTC Interval 471 ms    P Axis 46 degrees    QRS Axis 43 degrees    T Wave Axis 35 degrees   CBC and differential    Collection Time: 07/18/24  9:37 AM   Result Value Ref Range    WBC 10.73 (H) 4.31 - 10.16 Thousand/uL    RBC 5.13 3.88 - 5.62 Million/uL    Hemoglobin 15.2 12.0 - 17.0 g/dL    Hematocrit 45.4 36.5 - 49.3 %    MCV 89 82 - 98 fL    MCH 29.6 26.8 - 34.3 pg    MCHC 33.5 31.4 - 37.4 g/dL    RDW 12.9 11.6 - 15.1 %    MPV 10.1 8.9 - 12.7 fL    Platelets 406 (H) 149 - 390 Thousands/uL    nRBC 0 /100 WBCs    Segmented % 76 (H) 43 - 75 %    Immature Grans % 0 0 - 2 %    Lymphocytes % 16 14 - 44 %    Monocytes % 4 4 - 12 %    Eosinophils Relative 3 0 - 6 %    Basophils Relative 1 0 - 1 %    Absolute Neutrophils 8.22 (H) 1.85 - 7.62 Thousands/µL    Absolute Immature Grans 0.03 0.00 - 0.20 Thousand/uL    Absolute Lymphocytes 1.71 0.60 - 4.47 Thousands/µL    Absolute Monocytes 0.39 0.17 - 1.22 Thousand/µL    Eosinophils Absolute 0.31 0.00 - 0.61 Thousand/µL    Basophils Absolute 0.07 0.00 - 0.10 Thousands/µL   TSH, 3rd generation with Free T4 reflex    Collection Time: 07/18/24  9:37 AM   Result Value Ref Range    TSH 3RD GENERATON 1.807 0.450 - 4.500 uIU/mL   Vitamin B12    Collection Time: 07/18/24  9:37 AM   Result Value Ref Range    Vitamin B-12 284 180 - 914 pg/mL   Folate    Collection Time: 07/18/24  9:37 AM   Result  Value Ref Range    Folate 14.7 >5.9 ng/mL   Vitamin D 25 hydroxy    Collection Time: 07/18/24  9:37 AM   Result Value Ref Range    Vit D, 25-Hydroxy 20.9 (L) 30.0 - 100.0 ng/mL   Total Syphilis (IgG/IgM) Screening    Collection Time: 07/18/24  9:37 AM   Result Value Ref Range    Syphilis Total Antibody Non-reactive Non-Reactive        This note has been constructed using a voice recognition system. There may be translation, syntax, or grammatical errors. If you have any questions, please contact the dictating author.    Yunier Menendez MD  Psychiatry Residency, PGY-II

## 2024-07-20 LAB
ALBUMIN SERPL BCG-MCNC: 3.9 G/DL (ref 3.5–5)
ALP SERPL-CCNC: 59 U/L (ref 34–104)
ALT SERPL W P-5'-P-CCNC: 24 U/L (ref 7–52)
ANION GAP SERPL CALCULATED.3IONS-SCNC: 6 MMOL/L (ref 4–13)
AST SERPL W P-5'-P-CCNC: 26 U/L (ref 13–39)
BASOPHILS # BLD AUTO: 0.06 THOUSANDS/ÂΜL (ref 0–0.1)
BASOPHILS NFR BLD AUTO: 1 % (ref 0–1)
BILIRUB SERPL-MCNC: 0.38 MG/DL (ref 0.2–1)
BUN SERPL-MCNC: 11 MG/DL (ref 5–25)
CALCIUM SERPL-MCNC: 9.5 MG/DL (ref 8.4–10.2)
CHLORIDE SERPL-SCNC: 104 MMOL/L (ref 96–108)
CHOLEST SERPL-MCNC: 158 MG/DL
CO2 SERPL-SCNC: 31 MMOL/L (ref 21–32)
CREAT SERPL-MCNC: 0.71 MG/DL (ref 0.6–1.3)
EOSINOPHIL # BLD AUTO: 0.4 THOUSAND/ÂΜL (ref 0–0.61)
EOSINOPHIL NFR BLD AUTO: 5 % (ref 0–6)
ERYTHROCYTE [DISTWIDTH] IN BLOOD BY AUTOMATED COUNT: 12.9 % (ref 11.6–15.1)
GFR SERPL CREATININE-BSD FRML MDRD: 132 ML/MIN/1.73SQ M
GLUCOSE SERPL-MCNC: 96 MG/DL (ref 65–140)
HCT VFR BLD AUTO: 39 % (ref 36.5–49.3)
HDLC SERPL-MCNC: 30 MG/DL
HGB BLD-MCNC: 13.7 G/DL (ref 12–17)
IMM GRANULOCYTES # BLD AUTO: 0.02 THOUSAND/UL (ref 0–0.2)
IMM GRANULOCYTES NFR BLD AUTO: 0 % (ref 0–2)
LDLC SERPL CALC-MCNC: 98 MG/DL (ref 0–100)
LYMPHOCYTES # BLD AUTO: 1.98 THOUSANDS/ÂΜL (ref 0.6–4.47)
LYMPHOCYTES NFR BLD AUTO: 24 % (ref 14–44)
MCH RBC QN AUTO: 29.8 PG (ref 26.8–34.3)
MCHC RBC AUTO-ENTMCNC: 35.1 G/DL (ref 31.4–37.4)
MCV RBC AUTO: 85 FL (ref 82–98)
MONOCYTES # BLD AUTO: 0.52 THOUSAND/ÂΜL (ref 0.17–1.22)
MONOCYTES NFR BLD AUTO: 6 % (ref 4–12)
NEUTROPHILS # BLD AUTO: 5.45 THOUSANDS/ÂΜL (ref 1.85–7.62)
NEUTS SEG NFR BLD AUTO: 64 % (ref 43–75)
NONHDLC SERPL-MCNC: 128 MG/DL
NRBC BLD AUTO-RTO: 0 /100 WBCS
PLATELET # BLD AUTO: 349 THOUSANDS/UL (ref 149–390)
PMV BLD AUTO: 10.3 FL (ref 8.9–12.7)
POTASSIUM SERPL-SCNC: 3.9 MMOL/L (ref 3.5–5.3)
PROT SERPL-MCNC: 6.4 G/DL (ref 6.4–8.4)
RBC # BLD AUTO: 4.6 MILLION/UL (ref 3.88–5.62)
SODIUM SERPL-SCNC: 141 MMOL/L (ref 135–147)
TRIGL SERPL-MCNC: 149 MG/DL
WBC # BLD AUTO: 8.43 THOUSAND/UL (ref 4.31–10.16)

## 2024-07-20 PROCEDURE — 80061 LIPID PANEL: CPT | Performed by: NURSE PRACTITIONER

## 2024-07-20 PROCEDURE — 99232 SBSQ HOSP IP/OBS MODERATE 35: CPT | Performed by: STUDENT IN AN ORGANIZED HEALTH CARE EDUCATION/TRAINING PROGRAM

## 2024-07-20 PROCEDURE — 85025 COMPLETE CBC W/AUTO DIFF WBC: CPT | Performed by: NURSE PRACTITIONER

## 2024-07-20 PROCEDURE — 80053 COMPREHEN METABOLIC PANEL: CPT | Performed by: NURSE PRACTITIONER

## 2024-07-20 RX ADMIN — CLONIDINE HYDROCHLORIDE 0.2 MG: 0.1 TABLET ORAL at 08:58

## 2024-07-20 RX ADMIN — FOLIC ACID 1 MG: 1 TABLET ORAL at 08:58

## 2024-07-20 RX ADMIN — LOSARTAN POTASSIUM 100 MG: 50 TABLET, FILM COATED ORAL at 08:57

## 2024-07-20 RX ADMIN — NIFEDIPINE 30 MG: 30 TABLET, FILM COATED, EXTENDED RELEASE ORAL at 08:58

## 2024-07-20 RX ADMIN — CLONIDINE HYDROCHLORIDE 0.2 MG: 0.1 TABLET ORAL at 21:24

## 2024-07-20 RX ADMIN — CYANOCOBALAMIN TAB 1000 MCG 1000 MCG: 1000 TAB at 08:58

## 2024-07-20 RX ADMIN — ESCITALOPRAM OXALATE 5 MG: 5 TABLET, FILM COATED ORAL at 08:57

## 2024-07-20 NOTE — NURSING NOTE
Patient was visible for breakfast in the dayroom. Appears quiet this morning. Reports their mood as good. Denies depression, anxiety, SI, HI, and hallucinations of any kind. Medication compliant. Staff availability reinforced.

## 2024-07-20 NOTE — PROGRESS NOTES
"Progress Note - Behavioral Health   Kobi Rodriguez 23 y.o. male MRN: [id number]  Unit/Bed#: [facility] 385-01 Encounter: [id number]    Assessment   Principal Problem:    Unspecified mood (affective) disorder (HCC)  Active Problems:    Medical clearance for psychiatric admission    Obesity    Leukocytosis    HTN (hypertension)      Plan    Continue current medications.  Medical management per SLIM.  Discharge Planning    ----------------------------------------      Subjective: Per nursing report patient noted to be pleasant and cooperative on the unit.  Had elevated blood pressure yesterday to which was given losartan and clonidine and tolerated well.  He had a family meeting today with his daughter.    Reports today that his mood is \"okay\".  He does report that his depression is better, not feeling as down or depressed or hopeless.  He is more future oriented and expresses that he is looking forward to spending time with family and seeing friends post discharge.  He expresses some minor anxiety, mostly about his financial situation and having to have issues purchasing a car next week although expresses motivation to return back to work so he can continue to save money.  Denies any SI, HI, AVH.  Tolerating the Lexapro well without side effects.    Behavior over the last 24 hours:  unchanged  Sleep: normal  Appetite: normal  Medication side effects: No  ROS: no complaints and all other systems are negative    Mental Status Evaluation:  Appearance:  dressed appropriately, adequate grooming   Behavior:  pleasant, cooperative, calm   Speech:  normal rate and volume   Mood:  anxious   Affect:  slightly brighter   Thought Process:  organized, logical, coherent, goal directed   Associations: intact associations   Thought Content:  no overt delusions   Perceptual Disturbances: no auditory hallucinations, no visual hallucinations, denies auditory hallucinations when asked   Risk Potential: Suicidal ideation - None at " present  Homicidal ideation - None at present  Potential for aggression - No   Sensorium:  oriented to person, place, and time/date   Memory:  recent and remote memory grossly intact   Consciousness:  alert and awake   Attention/Concentration: attention span and concentration are age appropriate   Insight:  fair and improving   Judgment: fair and improving   Gait/Station: normal gait/station   Motor Activity: no abnormal movements     Medications: all current active meds have been reviewed and continue current psychiatric medications.  Current Facility-Administered Medications   Medication Dose Route Frequency Provider Last Rate    acetaminophen  650 mg Oral Q6H PRN Yunier Purvis MD      acetaminophen  650 mg Oral Q4H PRN Yunier Purvis MD      acetaminophen  975 mg Oral Q6H PRN Yunier Purvis MD      aluminum-magnesium hydroxide-simethicone  30 mL Oral Q4H PRN Yunier Purvis MD      benztropine  1 mg Intramuscular Q4H PRN Max 6/day Yunier Purvis MD      benztropine  1 mg Oral Q4H PRN Max 6/day Yunier Purvis MD      cloNIDine  0.2 mg Oral Q12H [facility] BISHOP Martines      cyanocobalamin  1,000 mcg Oral Daily BISHOP Martines      ergocalciferol  50,000 Units Oral Weekly BISHOP Martines      escitalopram  5 mg Oral Daily Yunier Purvis MD      folic acid  1 mg Oral Daily BISHOP Martines      hydrOXYzine HCL  25 mg Oral Q6H PRN Max 4/day Yunier Purvis MD      hydrOXYzine HCL  50 mg Oral Q4H PRN Max 4/day Yunier Purvis MD      Or    LORazepam  1 mg Intramuscular Q4H PRN Yunier Purvis MD      LORazepam  1 mg Oral Q4H PRN Max 6/day Yunier Purvis MD      Or    LORazepam  2 mg Intramuscular Q6H PRN Max 3/day Yunier Purvis MD      losartan  100 mg Oral Daily MASTER MartinesNP      NIFEdipine  30 mg Oral Daily Yunier Purvis MD      OLANZapine  5 mg Oral Q4H PRN Max 3/day Yunier Purvis MD      Or     OLANZapine  2.5 mg Intramuscular Q4H PRN Max 3/day Yunier Purvis MD      OLANZapine  5 mg Oral Q3H PRN Max 3/day Yunier Purvis MD      Or    OLANZapine  5 mg Intramuscular Q3H PRN Max 3/day Yunier Purvis MD      OLANZapine  2.5 mg Oral Q4H PRN Max 6/day Yunier Purvis MD      polyethylene glycol  17 g Oral Daily PRN Yunier Purvis MD      propranolol  10 mg Oral Q8H PRN Yunier Purvis MD      senna-docusate sodium  1 tablet Oral Daily PRN Yunier Purvis MD      traZODone  50 mg Oral HS PRN Yunier Purvis MD         Labs: I have personally reviewed all pertinent laboratory/tests results  Most Recent Labs:   Lab Results   Component Value Date    WBC 8.43 07/20/2024    RBC 4.60 07/20/2024    HGB 13.7 07/20/2024    HCT 39.0 07/20/2024     07/20/2024    RDW 12.9 07/20/2024    NEUTROABS 5.45 07/20/2024    SODIUM 141 07/20/2024    K 3.9 07/20/2024     07/20/2024    CO2 31 07/20/2024    BUN 11 07/20/2024    CREATININE 0.71 07/20/2024    GLUC 96 07/20/2024    CALCIUM 9.5 07/20/2024    AST 26 07/20/2024    ALT 24 07/20/2024    ALKPHOS 59 07/20/2024    TP 6.4 07/20/2024    ALB 3.9 07/20/2024    TBILI 0.38 07/20/2024    CHOLESTEROL 158 07/20/2024    HDL 30 (L) 07/20/2024    TRIG 149 07/20/2024    LDLCALC 98 07/20/2024    NONHDLC 128 07/20/2024    [id number] 1.807 07/18/2024    SYPHILISAB Non-reactive 07/18/2024    HGBA1C 5.3 10/26/2018     10/26/2018       Progress Toward Goals: progressing    Risks / Benefits of Treatment:    Risks, benefits, and possible side effects of medications explained to patient and patient verbalizes understanding and agreement for treatment.    Counseling / Coordination of Care:    Total floor / unit time spent today 25 minutes. Greater than 50% of total time was spent with the patient and / or family counseling and / or coordination of care. A description of counseling / coordination of care:  Patient's progress discussed with staff in  treatment team meeting.  Medications, treatment progress and treatment plan reviewed with patient.  Reassurance and supportive therapy provided.  Encouraged participation in milieu and group therapy on the unit.    Yogesh Pierson MD 07/20/24

## 2024-07-20 NOTE — PLAN OF CARE
Problem: Ineffective Coping  Goal: Cooperates with admission process  Description: Interventions:   - Complete admission process  Outcome: Progressing  Goal: Identifies ineffective coping skills  Outcome: Progressing  Goal: Identifies healthy coping skills  Outcome: Progressing  Goal: Demonstrates healthy coping skills  Outcome: Progressing  Goal: Participates in unit activities  Description: Interventions:  - Provide therapeutic environment   - Provide required programming   - Redirect inappropriate behaviors   Outcome: Progressing     Problem: Risk for Self Injury/Neglect  Goal: Treatment Goal: Remain safe during length of stay, learn and adopt new coping skills, and be free of self-injurious ideation, impulses and acts at the time of discharge  Outcome: Progressing  Goal: Attend and participate in unit activities, including therapeutic, recreational, and educational groups  Description: Interventions:  - Provide therapeutic and educational activities daily, encourage attendance and participation, and document same in the medical record  - Obtain collateral information, encourage visitation and family involvement in care   Outcome: Progressing  Goal: Recognize maladaptive responses and adopt new coping mechanisms  Outcome: Progressing     Problem: Depression  Goal: Verbalize thoughts and feelings  Description: Interventions:  - Assess and re-assess patient's level of risk   - Engage patient in 1:1 interactions, daily, for a minimum of 15 minutes   - Encourage patient to express feelings, fears, frustrations, hopes   Outcome: Progressing  Goal: Refrain from harming self  Description: Interventions:  - Monitor patient closely, per order   - Supervise medication ingestion, monitor effects and side effects   Outcome: Progressing  Goal: Refrain from isolation  Description: Interventions:  - Develop a trusting relationship   - Encourage socialization   Outcome: Progressing

## 2024-07-20 NOTE — NURSING NOTE
Pt requesting to set up a video call for tomorrow at 17:30 to see his daughter. Email for setup is [email or web address]    Will pass along in AM report.

## 2024-07-21 PROBLEM — F33.2 MAJOR DEPRESSIVE DISORDER, RECURRENT, SEVERE WITHOUT PSYCHOTIC FEATURES (HCC): Chronic | Status: ACTIVE | Noted: 2024-07-18

## 2024-07-21 PROCEDURE — 99232 SBSQ HOSP IP/OBS MODERATE 35: CPT | Performed by: STUDENT IN AN ORGANIZED HEALTH CARE EDUCATION/TRAINING PROGRAM

## 2024-07-21 RX ADMIN — NIFEDIPINE 30 MG: 30 TABLET, FILM COATED, EXTENDED RELEASE ORAL at 08:15

## 2024-07-21 RX ADMIN — FOLIC ACID 1 MG: 1 TABLET ORAL at 08:15

## 2024-07-21 RX ADMIN — CLONIDINE HYDROCHLORIDE 0.2 MG: 0.1 TABLET ORAL at 08:15

## 2024-07-21 RX ADMIN — LOSARTAN POTASSIUM 100 MG: 50 TABLET, FILM COATED ORAL at 08:14

## 2024-07-21 RX ADMIN — ESCITALOPRAM OXALATE 5 MG: 5 TABLET, FILM COATED ORAL at 08:14

## 2024-07-21 RX ADMIN — CYANOCOBALAMIN TAB 1000 MCG 1000 MCG: 1000 TAB at 08:15

## 2024-07-21 RX ADMIN — CLONIDINE HYDROCHLORIDE 0.2 MG: 0.1 TABLET ORAL at 21:11

## 2024-07-21 RX ADMIN — ACETAMINOPHEN 975 MG: 325 TABLET ORAL at 19:38

## 2024-07-21 NOTE — NURSING NOTE
Pt is calm and cooperative. Utilizes the pt phone frequently. Pt had a virtual visit with significant other. Denies SI, HI, AH, VH, and pain. Compliant with medication and snack. Denies unmet needs at this time.

## 2024-07-21 NOTE — PLAN OF CARE
Problem: Ineffective Coping  Goal: Cooperates with admission process  Description: Interventions:   - Complete admission process  Outcome: Progressing  Goal: Identifies ineffective coping skills  Outcome: Progressing  Goal: Identifies healthy coping skills  Outcome: Progressing  Goal: Demonstrates healthy coping skills  Outcome: Progressing     Problem: Risk for Self Injury/Neglect  Goal: Treatment Goal: Remain safe during length of stay, learn and adopt new coping skills, and be free of self-injurious ideation, impulses and acts at the time of discharge  Outcome: Progressing  Goal: Attend and participate in unit activities, including therapeutic, recreational, and educational groups  Description: Interventions:  - Provide therapeutic and educational activities daily, encourage attendance and participation, and document same in the medical record  - Obtain collateral information, encourage visitation and family involvement in care   Outcome: Progressing  Goal: Recognize maladaptive responses and adopt new coping mechanisms  Outcome: Progressing     Problem: Depression  Goal: Verbalize thoughts and feelings  Description: Interventions:  - Assess and re-assess patient's level of risk   - Engage patient in 1:1 interactions, daily, for a minimum of 15 minutes   - Encourage patient to express feelings, fears, frustrations, hopes   Outcome: Progressing  Goal: Refrain from harming self  Description: Interventions:  - Monitor patient closely, per order   - Supervise medication ingestion, monitor effects and side effects   Outcome: Progressing  Goal: Refrain from isolation  Description: Interventions:  - Develop a trusting relationship   - Encourage socialization   Outcome: Progressing

## 2024-07-21 NOTE — NURSING NOTE
Kobi was visible for breakfast in the small tv room. Expresses that they slept well last night. Appears and sounds more relaxed during conversation today compared to yesterday. Denies depression, anxiety, SI, HI, and hallucinations of any kind. Medication compliant. Denies any unmet needs or concerns at this time.

## 2024-07-21 NOTE — PROGRESS NOTES
"Progress Note - Behavioral Health   Kobi Rodriguez 23 y.o. male MRN: [id number]  Unit/Bed#: [facility] [place] 385-01 Encounter: [id number]    Assessment   Principal Problem:    Unspecified mood (affective) disorder (HCC)  Active Problems:    Medical clearance for psychiatric admission    Obesity    Leukocytosis    HTN (hypertension)      Plan    Continue current medications.  Medical management per SLIM.  Discharge Planning    ----------------------------------------      Subjective: Per nursing report patient had a virtual visit with his family yesterday.  Compliant with medications and meals.  No issues or concerns otherwise.    Reports that his mood today is \"pretty good\".  He is able to report feeling better today, more hopeful and future oriented.  States that anxiety levels are reduced after speaking with his family yesterday.  He is still future oriented, looking forward to discharge and wishes to return back to work.  He states that his sleep is improving, noting that yesterday was \"the first night that actually slept pretty good\".  Appetite levels are stable.  Denies adamantly any suicidal or homicidal ideations.  No side effects reported to the medicines thus far.    Behavior over the last 24 hours:  unchanged  Sleep: normal  Appetite: normal  Medication side effects: No  ROS: no complaints and all other systems are negative    Mental Status Evaluation:  Appearance:  age appropriate, casually dressed, adequate grooming   Behavior:  pleasant, cooperative, calm   Speech:  normal rate and volume   Mood:  euthymic   Affect:  slightly brighter   Thought Process:  organized, logical, coherent, goal directed   Associations: intact associations   Thought Content:  no overt delusions   Perceptual Disturbances: no auditory hallucinations, no visual hallucinations, does not appear responding to internal stimuli   Risk Potential: Suicidal ideation - None at present  Homicidal ideation - None at present  Potential for " aggression - No   Sensorium:  oriented to person, place, and time/date   Memory:  recent and remote memory grossly intact   Consciousness:  alert and awake   Attention/Concentration: attention span and concentration are age appropriate   Insight:  fair and improving   Judgment: fair and improving   Gait/Station: normal gait/station   Motor Activity: no abnormal movements     Medications: all current active meds have been reviewed and continue current psychiatric medications.  Current Facility-Administered Medications   Medication Dose Route Frequency Provider Last Rate    acetaminophen  650 mg Oral Q6H PRN Yunier Purvis MD      acetaminophen  650 mg Oral Q4H PRN Yunier Purvis MD      acetaminophen  975 mg Oral Q6H PRN Yunier Purvis MD      aluminum-magnesium hydroxide-simethicone  30 mL Oral Q4H PRN Yunier Purvis MD      benztropine  1 mg Intramuscular Q4H PRN Max 6/day Yunier Purvis MD      benztropine  1 mg Oral Q4H PRN Max 6/day Yunier Purvis MD      cloNIDine  0.2 mg Oral Q12H [facility] BISHOP Martiens      cyanocobalamin  1,000 mcg Oral Daily BISHOP Martines      ergocalciferol  50,000 Units Oral Weekly BISHOP Martines      escitalopram  5 mg Oral Daily Yunier Purvis MD      folic acid  1 mg Oral Daily BISHOP Martines      hydrOXYzine HCL  25 mg Oral Q6H PRN Max 4/day Yunier Purvis MD      hydrOXYzine HCL  50 mg Oral Q4H PRN Max 4/day Yunier Purvis MD      Or    LORazepam  1 mg Intramuscular Q4H PRN Yunier Purvis MD      LORazepam  1 mg Oral Q4H PRN Max 6/day Yunier Purvis MD      Or    LORazepam  2 mg Intramuscular Q6H PRN Max 3/day Yunier Purvis MD      losartan  100 mg Oral Daily MASTER MartinesNP      NIFEdipine  30 mg Oral Daily Yunier Purvis MD      OLANZapine  5 mg Oral Q4H PRN Max 3/day Yunier Purvis MD      Or    OLANZapine  2.5 mg Intramuscular Q4H PRN Max 3/day Yunier Calvo  MD Jessica      OLANZapine  5 mg Oral Q3H PRN Max 3/day Yunier Purvis MD      Or    OLANZapine  5 mg Intramuscular Q3H PRN Max 3/day Yunier Purvis MD      OLANZapine  2.5 mg Oral Q4H PRN Max 6/day Yunier Purvis MD      polyethylene glycol  17 g Oral Daily PRN Yunier Purvis MD      propranolol  10 mg Oral Q8H PRN Yunier Purvis MD      senna-docusate sodium  1 tablet Oral Daily PRN Yunier Purvis MD      traZODone  50 mg Oral HS PRN Yunier Purvis MD         Labs: I have personally reviewed all pertinent laboratory/tests results  Most Recent Labs:   Lab Results   Component Value Date    WBC 8.43 07/20/2024    RBC 4.60 07/20/2024    HGB 13.7 07/20/2024    HCT 39.0 07/20/2024     07/20/2024    RDW 12.9 07/20/2024    NEUTROABS 5.45 07/20/2024    SODIUM 141 07/20/2024    K 3.9 07/20/2024     07/20/2024    CO2 31 07/20/2024    BUN 11 07/20/2024    CREATININE 0.71 07/20/2024    GLUC 96 07/20/2024    CALCIUM 9.5 07/20/2024    AST 26 07/20/2024    ALT 24 07/20/2024    ALKPHOS 59 07/20/2024    TP 6.4 07/20/2024    ALB 3.9 07/20/2024    TBILI 0.38 07/20/2024    CHOLESTEROL 158 07/20/2024    HDL 30 (L) 07/20/2024    TRIG 149 07/20/2024    LDLCALC 98 07/20/2024    NONHDLC 128 07/20/2024    [id number] 1.807 07/18/2024    SYPHILISAB Non-reactive 07/18/2024    HGBA1C 5.3 10/26/2018     10/26/2018       Progress Toward Goals: progressing    Risks / Benefits of Treatment:    Risks, benefits, and possible side effects of medications explained to patient and patient verbalizes understanding and agreement for treatment.    Counseling / Coordination of Care:    Total floor / unit time spent today 25 minutes. Greater than 50% of total time was spent with the patient and / or family counseling and / or coordination of care. A description of counseling / coordination of care:  Patient's progress discussed with staff in treatment team meeting.  Medications, treatment progress and  treatment plan reviewed with patient.  Reassurance and supportive therapy provided.  Encouraged participation in milieu and group therapy on the unit.    Yogesh Pierson MD 07/21/24

## 2024-07-22 LAB
EST. AVERAGE GLUCOSE BLD GHB EST-MCNC: 108 MG/DL
HBA1C MFR BLD: 5.4 %

## 2024-07-22 PROCEDURE — 83036 HEMOGLOBIN GLYCOSYLATED A1C: CPT | Performed by: PSYCHIATRY & NEUROLOGY

## 2024-07-22 PROCEDURE — 99232 SBSQ HOSP IP/OBS MODERATE 35: CPT | Performed by: PSYCHIATRY & NEUROLOGY

## 2024-07-22 RX ORDER — ESCITALOPRAM OXALATE 10 MG/1
10 TABLET ORAL DAILY
Status: DISCONTINUED | OUTPATIENT
Start: 2024-07-23 | End: 2024-07-23 | Stop reason: HOSPADM

## 2024-07-22 RX ORDER — ESCITALOPRAM OXALATE 5 MG/1
5 TABLET ORAL ONCE
Status: COMPLETED | OUTPATIENT
Start: 2024-07-22 | End: 2024-07-22

## 2024-07-22 RX ADMIN — CYANOCOBALAMIN TAB 1000 MCG 1000 MCG: 1000 TAB at 08:07

## 2024-07-22 RX ADMIN — LOSARTAN POTASSIUM 100 MG: 50 TABLET, FILM COATED ORAL at 08:07

## 2024-07-22 RX ADMIN — ESCITALOPRAM OXALATE 5 MG: 5 TABLET, FILM COATED ORAL at 08:07

## 2024-07-22 RX ADMIN — FOLIC ACID 1 MG: 1 TABLET ORAL at 08:07

## 2024-07-22 RX ADMIN — CLONIDINE HYDROCHLORIDE 0.2 MG: 0.1 TABLET ORAL at 08:07

## 2024-07-22 RX ADMIN — ESCITALOPRAM OXALATE 5 MG: 5 TABLET, FILM COATED ORAL at 12:34

## 2024-07-22 RX ADMIN — NIFEDIPINE 30 MG: 30 TABLET, FILM COATED, EXTENDED RELEASE ORAL at 08:07

## 2024-07-22 RX ADMIN — CLONIDINE HYDROCHLORIDE 0.2 MG: 0.1 TABLET ORAL at 21:32

## 2024-07-22 NOTE — NURSING NOTE
Pt is calm, cooperative, and med compliant. Pt had a video chat with his family member. Pt denies depression and anxiety. Pt denies SI/HI and AH/VH. No unmet needs reported. Will continue to monitor.

## 2024-07-22 NOTE — PROGRESS NOTES
"Progress Note - Behavioral Health   Kobi Rodriguez 23 y.o. male MRN: [id number]  Unit/Bed#: [facility] 385-01 Encounter: [id number]    Assessment & Plan   Principal Problem:    Major depressive disorder, recurrent, severe without psychotic features (HCC)  Active Problems:    Medical clearance for psychiatric admission    Obesity    Leukocytosis    HTN (hypertension)      Recommended Treatment:      Will make the following medication changes:  Increase Lexapro to 10 mg daily for depressive symptoms   Continue with group therapy, milieu therapy and occupational therapy.    Continue frequent safety checks and vitals per unit protocol.  Continue with SLIM medical management as indicated  Continue coordinating with case management regarding disposition    Legal Status: 201  Disposition: To be determined, coordinating with case management    Case discussed with treatment team.  Risks, benefits and possible side effects of Medications: Risks, benefits, and possible side effects of medications have been explained to the patient, who verbalizes understanding    ------------------------------------------------------------    Subjective: Patient's chart was reviewed, and patient's progress and plan was discussed with treatment team. Per nursing report, Kobi has been cooperative on the unit and compliant with medications.     Kobi was evaluated this morning for continuity of care. On examination, Kobi is pleasant, calm, and cooperative. He states his mood is \"improved\" since he was admitted, and is looking forward to discharge.  He has brighter in affect than previous. After discharge, he plans on continuing to take his current medication regimen, pursuing outpatient counseling, and states that he will be more open with his emotions to his fiancée and family.  He denies any issues with sleep, energy, or appetite.He denies adverse effects from medications has been tolerating the Lexapro.  He is agreeable to increasing the " "dose to 10 mg daily.  He denies suicidal ideation, homicidal ideation, auditory hallucinations, and visual hallucinations.     VS: Reviewed,  elevated this morning at 170/78, otherwise within normal limits    Progress Toward Goals:  improvement in mood and affect.  Stable for discharge tomorrow    Psychiatric Review of Systems:  Behavior over the last 24 hours: improved  Sleep: normal  Appetite: adequate  Medication side effects: none verbalized  Medical ROS:  Denies any chest pain, shortness of breath, constipation, diarrhea.  All other systems negative.    Vital signs in last 24 hours:  Temp:  [97.7 °F (36.5 °C)] 97.7 °F (36.5 °C)  HR:  [66-73] 68  Resp:  [16] 16  BP: (137-170)/(70-80) 170/78    Mental Status Exam:    Appearance:  appears stated age, casually dressed, appropriate grooming and hygiene, and bearded   Behavior:  calm, cooperative, and pleasant   Speech:  spontaneous, normal rate, normal volume, and coherent   Mood:  \"Improved\"   Affect:  constricted, but brighter than previous   Thought Process:  Organized, logical, goal-directed   Associations: concrete associations   Thought Content:  no verbalized delusions or overt paranoia   Perceptual Disturbances: no reported hallucinations and does not appear to be responding to internal stimuli at this time   Risk Potential: Suicidal ideation - None at present  Homicidal ideation - None at present  Potential for aggression - Not at present   Sensorium:  oriented to person, place, and time/date   Memory:  recent and remote memory grossly intact   Consciousness:  alert and awake   Attention/Concentration: attention span and concentration are age appropriate   Insight:  fair   Judgment: fair   Gait/Station: normal gait/station   Motor Activity: no abnormal movements     Current Medications:  Current Facility-Administered Medications   Medication Dose Route Frequency Provider Last Rate    acetaminophen  650 mg Oral Q6H PRN Yunier Purvis MD      " acetaminophen  650 mg Oral Q4H PRN Yunier Purvis MD      acetaminophen  975 mg Oral Q6H PRN Yunier Purvis MD      aluminum-magnesium hydroxide-simethicone  30 mL Oral Q4H PRN Yunier Purvis MD      benztropine  1 mg Intramuscular Q4H PRN Max 6/day Yunier Purvis MD      benztropine  1 mg Oral Q4H PRN Max 6/day Yunier Purvis MD      cloNIDine  0.2 mg Oral Q12H [facility] BISHOP Martines      cyanocobalamin  1,000 mcg Oral Daily BISHOP Martines      ergocalciferol  50,000 Units Oral Weekly BISHOP Martines      [START ON 7/23/2024] escitalopram  10 mg Oral Daily Yunier Purvis MD      folic acid  1 mg Oral Daily BISHOP Martines      hydrOXYzine HCL  25 mg Oral Q6H PRN Max 4/day Yunier Purvis MD      hydrOXYzine HCL  50 mg Oral Q4H PRN Max 4/day Yunier Purvis MD      Or    LORazepam  1 mg Intramuscular Q4H PRN Yunier Purvis MD      LORazepam  1 mg Oral Q4H PRN Max 6/day Yunier Purvis MD      Or    LORazepam  2 mg Intramuscular Q6H PRN Max 3/day Yunier Purvis MD      losartan  100 mg Oral Daily BISHOP Martines      NIFEdipine  30 mg Oral Daily Yunier Purvis MD      OLANZapine  5 mg Oral Q4H PRN Max 3/day Yunier Purvis MD      Or    OLANZapine  2.5 mg Intramuscular Q4H PRN Max 3/day Yunier Purvis MD      OLANZapine  5 mg Oral Q3H PRN Max 3/day Yunier Purvis MD      Or    OLANZapine  5 mg Intramuscular Q3H PRN Max 3/day Yunier Purvis MD      OLANZapine  2.5 mg Oral Q4H PRN Max 6/day Yunier Purvis MD      polyethylene glycol  17 g Oral Daily PRN Yunier Purvis MD      propranolol  10 mg Oral Q8H PRN Yunier Purvis MD      senna-docusate sodium  1 tablet Oral Daily PRN Yunier Purvis MD      traZODone  50 mg Oral HS PRN Yunier Purvis MD         Behavioral Health Medications: all current active meds have been reviewed. Changes as in plan section above.    Laboratory  results:  I have personally reviewed all pertinent laboratory/tests results.   No results found for this or any previous visit (from the past 48 hour(s)).     This note has been constructed using a voice recognition system. There may be translation, syntax, or grammatical errors. If you have any questions, please contact the dictating author.    Yunier Menendez MD  Psychiatry Residency, PGY-II

## 2024-07-22 NOTE — NURSING NOTE
Pt denies SI, HI, AH and VH. Pt is calm, cooperative and pleasant. Pt has no complaints or concerns. Pt is visible in the milieu but mainly isolative to self, attending groups. Medication and meal compliant.

## 2024-07-22 NOTE — CASE MANAGEMENT
CM met with patient to discuss discharge planning. Pt reported that he will be discharging to his [facility] ([address] [place] [address]) and identified girlfriend as support system.   CM agreed in contacting Aleta at [phone] to discuss discharge plan and select time for discharge.   Scripts can be sent to [facility]   Patient requested work excuse stating that he can return to work by 07/26.  CM discussed referral to [facility] for  funding and [place] nursing resources that will be added to his AVS.   CM will contact [facility] to submit referral for  services.

## 2024-07-22 NOTE — CASE MANAGEMENT
Phone call placed to pt's girlfriend Aleta [phone] to discuss discharge planning.   Discharge time tomorrow at 12 PM  Discharge address: [address] [place] [address]   No concerns about discharge was reported and Aleta reported that they have been in communication. Pt sounds good over the phone and has been speaking with their 5 months old baby.   Pt does not have access to firearms .   CM explained that medicaid application was submitted once he was admitted but there are some documentation that he submit as soon as he gets discharged.   CM explained that she is working on getting him connected with [facility] but in case appointment is not schedule that information about organization will be added to AVS.

## 2024-07-22 NOTE — PROGRESS NOTES
07/22/24 0906   Team Meeting   Meeting Type Daily Rounds   Team Members Present   Team Members Present Physician;Nurse;   Physician Team Member Travis   Nursing Team Member Kirk   Care Management Team Member Saad   Patient/Family Present   Patient Present No   Patient's Family Present No     201. Pt is med/ meal compliant. Pt denies all symptoms. Pt discharge on Wednesday.

## 2024-07-22 NOTE — DISCHARGE INSTR - APPOINTMENTS
CRISIS INFORMATION  If you are experiencing a mental health emergency, you may call the [facility] Crisis Intervention Office 24 hours a day, 7 days per week at [phone].    In [facility], call [phone].    Warmline is a confidential 24/7 telephone support service manned by trained mental health consumers.  [facility] provides support, a listening ear and can provide information about available services.[facility] specializes in the concerns of mental health consumers, their families and friends.  However, we are also here for anyone who has a mental health concern, is confused about or just doesn't know anything about mental health or where to get information.  To reach [facility], call [phone].    HOW TO GET SUBSTANCE ABUSE HELP:  If you or someone you know has a drug or alcohol problem, there is help:  [facility] Drug & Alcohol Abuse Services: [phone]  [facility] Drug & Alcohol Abuse Services: [phone]  An assessment is the first step.   In addition to those listed there are other programs available in the area but assessment is best to determine an appropriate level of care.  If you DO NOT have Medical Assistance (MA) or Private Insurance, an assessment can be scheduled at one of these providers:  [facility]  [address] [place] [address]  [phone]   [facility]  [address] [place] [address]  [phone]   [address] [place] [address]  [phone]   [facility]  [address] [place] [address]  [phone]   [facility]  [address] [place] [address]  [phone]   [facility]  [address] [place] [address]  [phone]   [facility]  [address], [place] [address]  [phone]     If you HAVE Medical Assistance, an assessment can be scheduled at one of these providers:  [facility]  [address] [place] [address]  [phone]   [facility]  [address] [place] [address]  [phone]   [facility]  [address] [facility] [address], [place] [address]  [phone]  [address] [place] [address], [address], [place] [address] [phone]   [address] [address]  [phone]   [facility]  [address] [place] [address]  [phone]   [facility] ([facility])  [address] [place] [address]  [phone]   [facility]  [address] [place] [address]  [phone]   [facility]  [address] [place] [address]  [phone]   [facility]  [address] [place] [address]  [phone]   [employer] [employer]  [address], [place] [address]  [phone]     If you HAVE Private Insurance, an assessment can be scheduled at one of these providers.  Please contact these Providers to determine if they are in your network plan:  [facility]  [address], [place] [address]  [phone]   [address] [address]  [phone]   [facility]  [address] [place] [address]  [phone]   [facility] ([facility])  [address] [place] [address]  [phone]   [facility]  [address] [place] [address]  [phone]   [employer]  [address], [place] [address]  [phone]     From the [facility] website [place]guides[place]opioid-epidemic/#GetNaloxone    How do I get naloxone?  Family members and friends can access naloxone by:    Obtaining a prescription from their family doctor  Using the standing order issued by Acting Physician General Aurora Dunbar. A standing order is a prescription written  for the general public, rather than specifically for an individual.  To use the standing order, print it and take it with you to the pharmacy or have the digital version on your phone. Download the standing order from the Department of [facility] (PDF).    If you are unable to print it or use the digital version, the standing order is kept on file at many pharmacies. If a pharmacy does not have it on file, they may have the ability to look it up.    Naloxone prescriptions can be filled at most pharmacies. Although the medication might not be available for same-day pickup, it often can be ordered and available within a day or two.

## 2024-07-22 NOTE — CASE MANAGEMENT
Phone call placed to [facility] at [phone] to schedule an intake appointment for medication management and psychotherapy. Left message.

## 2024-07-23 VITALS
OXYGEN SATURATION: 99 % | DIASTOLIC BLOOD PRESSURE: 77 MMHG | BODY MASS INDEX: 37.44 KG/M2 | WEIGHT: 252.8 LBS | TEMPERATURE: 97.5 F | HEIGHT: 69 IN | RESPIRATION RATE: 16 BRPM | SYSTOLIC BLOOD PRESSURE: 161 MMHG | HEART RATE: 60 BPM

## 2024-07-23 PROBLEM — E55.9 VITAMIN D DEFICIENCY: Status: ACTIVE | Noted: 2024-07-23

## 2024-07-23 PROBLEM — E53.8 FOLIC ACID DEFICIENCY: Status: ACTIVE | Noted: 2024-07-23

## 2024-07-23 PROBLEM — D72.829 LEUKOCYTOSIS: Status: RESOLVED | Noted: 2024-07-18 | Resolved: 2024-07-23

## 2024-07-23 PROBLEM — E53.8 B12 DEFICIENCY: Status: ACTIVE | Noted: 2024-07-23

## 2024-07-23 PROBLEM — Z00.8 MEDICAL CLEARANCE FOR PSYCHIATRIC ADMISSION: Status: RESOLVED | Noted: 2024-07-18 | Resolved: 2024-07-23

## 2024-07-23 PROCEDURE — 99238 HOSP IP/OBS DSCHRG MGMT 30/<: CPT | Performed by: PSYCHIATRY & NEUROLOGY

## 2024-07-23 RX ORDER — ESCITALOPRAM OXALATE 10 MG/1
10 TABLET ORAL DAILY
Qty: 30 TABLET | Refills: 1 | Status: SHIPPED | OUTPATIENT
Start: 2024-07-23 | End: 2024-08-22

## 2024-07-23 RX ORDER — LOSARTAN POTASSIUM 100 MG/1
100 TABLET ORAL DAILY
Qty: 30 TABLET | Refills: 1 | Status: SHIPPED | OUTPATIENT
Start: 2024-07-23 | End: 2024-08-22

## 2024-07-23 RX ORDER — CLONIDINE HYDROCHLORIDE 0.2 MG/1
0.2 TABLET ORAL EVERY 12 HOURS SCHEDULED
Qty: 60 TABLET | Refills: 0 | Status: SHIPPED | OUTPATIENT
Start: 2024-07-23 | End: 2024-08-22

## 2024-07-23 RX ORDER — FOLIC ACID 1 MG/1
1 TABLET ORAL DAILY
Qty: 30 TABLET | Refills: 1 | Status: SHIPPED | OUTPATIENT
Start: 2024-07-23 | End: 2024-07-23

## 2024-07-23 RX ORDER — FOLIC ACID 1 MG/1
1 TABLET ORAL DAILY
Qty: 30 TABLET | Refills: 1 | Status: SHIPPED | OUTPATIENT
Start: 2024-07-23 | End: 2024-08-22

## 2024-07-23 RX ORDER — ESCITALOPRAM OXALATE 10 MG/1
10 TABLET ORAL DAILY
Qty: 30 TABLET | Refills: 1 | Status: SHIPPED | OUTPATIENT
Start: 2024-07-23 | End: 2024-07-23

## 2024-07-23 RX ORDER — ERGOCALCIFEROL 1.25 MG/1
50000 CAPSULE ORAL WEEKLY
Qty: 4 CAPSULE | Refills: 0 | Status: SHIPPED | OUTPATIENT
Start: 2024-07-26 | End: 2024-07-23

## 2024-07-23 RX ORDER — CLONIDINE HYDROCHLORIDE 0.2 MG/1
0.2 TABLET ORAL EVERY 12 HOURS SCHEDULED
Qty: 60 TABLET | Refills: 1 | Status: SHIPPED | OUTPATIENT
Start: 2024-07-23 | End: 2024-07-23

## 2024-07-23 RX ORDER — NIFEDIPINE 30 MG/1
30 TABLET, EXTENDED RELEASE ORAL DAILY
Qty: 30 TABLET | Refills: 1 | Status: SHIPPED | OUTPATIENT
Start: 2024-07-23 | End: 2024-08-22

## 2024-07-23 RX ORDER — ERGOCALCIFEROL 1.25 MG/1
50000 CAPSULE ORAL WEEKLY
Qty: 4 CAPSULE | Refills: 0 | Status: SHIPPED | OUTPATIENT
Start: 2024-07-26 | End: 2024-09-07

## 2024-07-23 RX ADMIN — LOSARTAN POTASSIUM 100 MG: 50 TABLET, FILM COATED ORAL at 08:07

## 2024-07-23 RX ADMIN — NIFEDIPINE 30 MG: 30 TABLET, FILM COATED, EXTENDED RELEASE ORAL at 08:06

## 2024-07-23 RX ADMIN — CYANOCOBALAMIN TAB 1000 MCG 1000 MCG: 1000 TAB at 08:07

## 2024-07-23 RX ADMIN — ESCITALOPRAM OXALATE 10 MG: 10 TABLET ORAL at 08:07

## 2024-07-23 RX ADMIN — FOLIC ACID 1 MG: 1 TABLET ORAL at 08:07

## 2024-07-23 RX ADMIN — CLONIDINE HYDROCHLORIDE 0.2 MG: 0.1 TABLET ORAL at 08:07

## 2024-07-23 RX ADMIN — ACETAMINOPHEN 650 MG: 325 TABLET ORAL at 10:49

## 2024-07-23 NOTE — NURSING NOTE
Pt observed in milieu and dayroom. He is pleasant in his interactions with staff and peers. Medication and meal compliant. He denies SI/HI/AH/VH, and expresses that he is looking forward to discharge today. He attended group this morning. No PRN's requested. No unmet needs this shift.

## 2024-07-23 NOTE — NURSING NOTE
Pt is visible, calm, cooperative, social. Pt had video call with girlfriend. Pt is looking forward to discharge tomorrow, denies SS and unmet needs. Pt is medication adherent.

## 2024-07-23 NOTE — PLAN OF CARE
Problem: Ineffective Coping  Goal: Cooperates with admission process  Description: Interventions:   - Complete admission process  Outcome: Completed  Goal: Identifies ineffective coping skills  Outcome: Completed  Goal: Identifies healthy coping skills  Outcome: Completed  Goal: Demonstrates healthy coping skills  Outcome: Completed  Goal: Participates in unit activities  Description: Interventions:  - Provide therapeutic environment   - Provide required programming   - Redirect inappropriate behaviors   Outcome: Completed     Problem: Risk for Self Injury/Neglect  Goal: Treatment Goal: Remain safe during length of stay, learn and adopt new coping skills, and be free of self-injurious ideation, impulses and acts at the time of discharge  Outcome: Completed  Goal: Attend and participate in unit activities, including therapeutic, recreational, and educational groups  Description: Interventions:  - Provide therapeutic and educational activities daily, encourage attendance and participation, and document same in the medical record  - Obtain collateral information, encourage visitation and family involvement in care   Outcome: Completed  Goal: Recognize maladaptive responses and adopt new coping mechanisms  Outcome: Completed     Problem: Depression  Goal: Verbalize thoughts and feelings  Description: Interventions:  - Assess and re-assess patient's level of risk   - Engage patient in 1:1 interactions, daily, for a minimum of 15 minutes   - Encourage patient to express feelings, fears, frustrations, hopes   Outcome: Completed  Goal: Refrain from harming self  Description: Interventions:  - Monitor patient closely, per order   - Supervise medication ingestion, monitor effects and side effects   Outcome: Completed  Goal: Refrain from isolation  Description: Interventions:  - Develop a trusting relationship   - Encourage socialization   Outcome: Completed     Problem: DISCHARGE PLANNING  Goal: Discharge to home or other  facility with appropriate resources  Description: INTERVENTIONS:  - Identify barriers to discharge w/patient and caregiver  - Arrange for needed discharge resources and transportation as appropriate  - Identify discharge learning needs (meds, wound care, etc.)  - Arrange for interpretive services to assist at discharge as needed  - Refer to Case Management Department for coordinating discharge planning if the patient needs post-hospital services based on physician/advanced practitioner order or complex needs related to functional status, cognitive ability, or social support system  Outcome: Completed
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
## 2024-07-23 NOTE — NURSING NOTE
Pt discharged and accompanied by staff off of unit 3P at 1200. Pt was given all personal belongings, discharge instructions and acknowledges understanding of all expectations and education after discharge as well as followup recommendations.

## 2024-07-23 NOTE — CASE MANAGEMENT
Pt to D/C today. Pt denies SI/HI/AVH. Pt oriented x3. Pt to d/c to home and girlfriend will  upon discharge. Pt to follow up with [employer] as soon as he gets discharged to submit documentation for MA application. Pt has check list/ next step paperwork for MA application, and CM added information of [facility]/ [facility] for support as needed. Patient was provided with information for medical support due to heart conditions. Pt left with medications in hand.     Discharge Address: [address] [place], [place] [address]   Phone:  [phone]

## 2024-07-23 NOTE — BH TRANSITION RECORD
Contact Information: If you have any questions, concerns, pended studies, tests and/or procedures, or emergencies regarding your inpatient behavioral health visit. Please contact [facility] [phone] and ask to speak to a , nurse or physician. A contact is available 24 hours/ 7 days a week at this number.     Summary of Procedures Performed During your Stay:  Below is a list of major procedures performed during your hospital stay and a summary of results:  - Cardiac Procedures/Studies: EKG 7/17/2024.  Normal sinus rhythm  Normal ECG  No previous ECGs available  Confirmed by Gil Stevens ([id number]) on 7/17/2024 5:13:31 PM  Pending Studies (From admission, onward)      None          Please follow up on the above pending studies with your PCP and/or referring provider.

## 2024-07-23 NOTE — PROGRESS NOTES
07/23/24 0907   Team Meeting   Meeting Type Daily Rounds   Team Members Present   Team Members Present Physician;Nurse;   Physician Team Member Travis   Nursing Team Member Vega   Care Management Team Member Saad   Patient/Family Present   Patient Present No   Patient's Family Present No     201. Pt discharge today at 12 PM.